# Patient Record
Sex: MALE | Race: WHITE | NOT HISPANIC OR LATINO | Employment: UNEMPLOYED | URBAN - METROPOLITAN AREA
[De-identification: names, ages, dates, MRNs, and addresses within clinical notes are randomized per-mention and may not be internally consistent; named-entity substitution may affect disease eponyms.]

---

## 2017-06-10 ENCOUNTER — OFFICE VISIT (OUTPATIENT)
Dept: URGENT CARE | Facility: CLINIC | Age: 22
End: 2017-06-10
Payer: COMMERCIAL

## 2017-06-10 VITALS
SYSTOLIC BLOOD PRESSURE: 124 MMHG | HEIGHT: 68 IN | OXYGEN SATURATION: 100 % | TEMPERATURE: 99.7 F | WEIGHT: 142 LBS | BODY MASS INDEX: 21.52 KG/M2 | HEART RATE: 97 BPM | DIASTOLIC BLOOD PRESSURE: 86 MMHG

## 2017-06-10 DIAGNOSIS — R50.9 FEVER, UNSPECIFIED FEVER CAUSE: ICD-10-CM

## 2017-06-10 PROCEDURE — 99203 OFFICE O/P NEW LOW 30 MIN: CPT | Performed by: PHYSICIAN ASSISTANT

## 2017-06-10 RX ORDER — OSELTAMIVIR PHOSPHATE 75 MG/1
75 CAPSULE ORAL 2 TIMES DAILY
Qty: 10 CAP | Refills: 0 | Status: SHIPPED | OUTPATIENT
Start: 2017-06-10 | End: 2017-06-15

## 2017-06-10 RX ORDER — CEPHALEXIN 500 MG/1
1000 CAPSULE ORAL 2 TIMES DAILY
Qty: 28 CAP | Refills: 0 | Status: SHIPPED | OUTPATIENT
Start: 2017-06-10 | End: 2017-06-17

## 2017-06-10 RX ORDER — IBUPROFEN 200 MG
200 TABLET ORAL EVERY 6 HOURS PRN
COMMUNITY
End: 2018-07-12

## 2017-06-10 RX ORDER — NAPROXEN 500 MG/1
500 TABLET ORAL 2 TIMES DAILY WITH MEALS
COMMUNITY
End: 2018-07-12

## 2017-06-10 ASSESSMENT — ENCOUNTER SYMPTOMS
RESPIRATORY NEGATIVE: 1
COUGH: 0
TROUBLE SWALLOWING: 1
SORE THROAT: 1
SWOLLEN GLANDS: 1
EYES NEGATIVE: 1
CONSTITUTIONAL NEGATIVE: 1

## 2017-06-10 NOTE — MR AVS SNAPSHOT
"        Nato Calvillo   6/10/2017 12:00 PM   Office Visit   MRN: [id number]    Department:  [facility]   Dept Phone:  [phone]    Description:  Male : 1995   Provider:  Mj Chilel PA-C           Reason for Visit     Pharyngitis X 3 days, Ear pain X 1 week, fever, dizzy spells, passed out last night       Allergies as of 6/10/2017     Allergen Noted Reactions    Amoxicillin 06/10/2017   Hives    HIves and fever as child       You were diagnosed with     Fever, unspecified fever cause   [[id number]]         Vital Signs     Blood Pressure Pulse Temperature Height Weight Body Mass Index    124/86 mmHg 97 37.6 °C (99.7 °F) 1.727 m (5' 7.99\") 64.411 kg (142 lb) 21.60 kg/m2    Oxygen Saturation                   100%           Basic Information     Date Of Birth Sex Race Ethnicity Preferred Language    1995 Male White Non- English      Health Maintenance     Patient has no pending health maintenance at this time      Current Immunizations     No immunizations on file.      Below and/or attached are the medications your provider expects you to take. Review all of your home medications and newly ordered medications with your provider and/or pharmacist. Follow medication instructions as directed by your provider and/or pharmacist. Please keep your medication list with you and share with your provider. Update the information when medications are discontinued, doses are changed, or new medications (including over-the-counter products) are added; and carry medication information at all times in the event of emergency situations     Allergies:  AMOXICILLIN - Hives               Medications  Valid as of: Leonie 10, 2017 - 12:39 PM    Generic Name Brand Name Tablet Size Instructions for use    Cephalexin (Cap) KEFLEX 500 MG Take 2 Caps by mouth 2 times a day for 7 days.        Ibuprofen (Tab) MOTRIN 200 MG Take 200 mg by mouth every 6 hours as needed.        Naproxen (Tab) NAPROSYN 500 MG Take 500 mg " by mouth 2 times a day, with meals.        Oseltamivir Phosphate (Cap) TAMIFLU 75 MG Take 1 Cap by mouth 2 times a day for 5 days.        .                 Medicines prescribed today were sent to:     None      Medication refill instructions:       If your prescription bottle indicates you have medication refills left, it is not necessary to call your provider’s office. Please contact your pharmacy and they will refill your medication.    If your prescription bottle indicates you do not have any refills left, you may request refills at any time through one of the following ways: The online Playviews system (except Urgent Care), by calling your provider’s office, or by asking your pharmacy to contact your provider’s office with a refill request. Medication refills are processed only during regular business hours and may not be available until the next business day. Your provider may request additional information or to have a follow-up visit with you prior to refilling your medication.   *Please Note: Medication refills are assigned a new Rx number when refilled electronically. Your pharmacy may indicate that no refills were authorized even though a new prescription for the same medication is available at the pharmacy. Please request the medicine by name with the pharmacy before contacting your provider for a refill.           Playviews Access Code: [email or web address]  Expires: 7/10/2017 12:39 PM    Playviews  A secure, online tool to manage your health information     [employer]’s Playviews® is a secure, online tool that connects you to your personalized health information from the privacy of your home -- day or night - making it very easy for you to manage your healthcare. Once the activation process is completed, you can even access your medical information using the Playviews shellie, which is available for free in the Apple Shellie store or Google Play store.     Playviews provides the following levels of access (as shown below):    My Chart Features   [facility] Doctor [facility]  Specialists [facility]  Urgent  Care Non-[facility]  Primary Care  Doctor   Email your healthcare team securely and privately 24/7 X X X    Manage appointments: schedule your next appointment; view details of past/upcoming appointments X      Request prescription refills. X      View recent personal medical records, including lab and immunizations X X X X   View health record, including health history, allergies, medications X X X X   Read reports about your outpatient visits, procedures, consult and ER notes X X X X   See your discharge summary, which is a recap of your hospital and/or ER visit that includes your diagnosis, lab results, and care plan. X X       How to register for [facility]:  1. Go to  [email or web address].  2. Click on the Sign Up Now box, which takes you to the New Member Sign Up page. You will need to provide the following information:  a. Enter your [facility] Access Code exactly as it appears at the top of this page. (You will not need to use this code after you’ve completed the sign-up process. If you do not sign up before the expiration date, you must request a new code.)   b. Enter your date of birth.   c. Enter your home email address.   d. Click Submit, and follow the next screen’s instructions.  3. Create a [facility] ID. This will be your [facility] login ID and cannot be changed, so think of one that is secure and easy to remember.  4. Create a [facility] password. You can change your password at any time.  5. Enter your Password Reset Question and Answer. This can be used at a later time if you forget your password.   6. Enter your e-mail address. This allows you to receive e-mail notifications when new information is available in [facility].  7. Click Sign Up. You can now view your health information.    For assistance activating your [facility] account, call [phone]

## 2017-06-10 NOTE — PROGRESS NOTES
"Subjective:      Nato Calvillo is a 21 y.o. male who presents with Pharyngitis            Pharyngitis   This is a new problem. The current episode started in the past 7 days (st, ear pn this wk; improved now but still running fevers 102; no cough/GI sx). The problem has been unchanged. Neither side of throat is experiencing more pain than the other. There has been no fever. The pain is moderate. Associated symptoms include ear pain, swollen glands and trouble swallowing. Pertinent negatives include no coughing or ear discharge. He has had no exposure to strep or mono. He has tried nothing for the symptoms. The treatment provided no relief.       Review of Systems   Constitutional: Negative.    HENT: Positive for ear pain, sore throat and trouble swallowing. Negative for ear discharge.    Eyes: Negative.    Respiratory: Negative.  Negative for cough.    Skin: Negative.           Objective:     /86 mmHg  Pulse 97  Temp(Src) 37.6 °C (99.7 °F)  Ht 1.727 m (5' 7.99\")  Wt 64.411 kg (142 lb)  BMI 21.60 kg/m2  SpO2 100%     Physical Exam   Constitutional: He is oriented to person, place, and time. He appears well-developed and well-nourished. No distress.   HENT:   Head: Normocephalic and atraumatic.   Mouth/Throat: No oropharyngeal exudate.   +phar./tons redn     Eyes: EOM are normal. Pupils are equal, round, and reactive to light.   Neck: Normal range of motion. Neck supple.   Cardiovascular: Normal rate.    Pulmonary/Chest: Effort normal. No respiratory distress.   Lymphadenopathy:     He has cervical adenopathy.   Neurological: He is alert and oriented to person, place, and time.   Skin: Skin is warm and dry.   Psychiatric: He has a normal mood and affect. His behavior is normal. Judgment and thought content normal.   Nursing note and vitals reviewed.    Filed Vitals:    06/10/17 1200   BP: 124/86   Pulse: 97   Temp: 37.6 °C (99.7 °F)   Height: 1.727 m (5' 7.99\")   Weight: 64.411 kg (142 lb)   SpO2: 100% "     Active Ambulatory Problems     Diagnosis Date Noted   • No Active Ambulatory Problems     Resolved Ambulatory Problems     Diagnosis Date Noted   • No Resolved Ambulatory Problems     No Additional Past Medical History     No current outpatient prescriptions on file prior to visit.     No current facility-administered medications on file prior to visit.     Gargles, Cepacol lozenges, Aleve/Advil as needed for throat pain  History reviewed. No pertinent family history.  Amoxicillin            Assessment/Plan:     ·  fever, sweats; viral?[flu?]; vs other      · Trial tamiflu; [abx if persist only]  · F/u PCP if sx persist; would need labs etc.

## 2018-07-12 ENCOUNTER — OFFICE VISIT (OUTPATIENT)
Dept: INTERNAL MEDICINE | Facility: MEDICAL CENTER | Age: 23
End: 2018-07-12
Payer: COMMERCIAL

## 2018-07-12 ENCOUNTER — HOSPITAL ENCOUNTER (OUTPATIENT)
Dept: LAB | Facility: MEDICAL CENTER | Age: 23
End: 2018-07-12
Attending: INTERNAL MEDICINE
Payer: COMMERCIAL

## 2018-07-12 VITALS
HEIGHT: 67 IN | WEIGHT: 164 LBS | TEMPERATURE: 98.7 F | OXYGEN SATURATION: 93 % | DIASTOLIC BLOOD PRESSURE: 98 MMHG | SYSTOLIC BLOOD PRESSURE: 128 MMHG | HEART RATE: 98 BPM | RESPIRATION RATE: 18 BRPM | BODY MASS INDEX: 25.74 KG/M2

## 2018-07-12 DIAGNOSIS — R21 SKIN RASH: ICD-10-CM

## 2018-07-12 DIAGNOSIS — F41.9 ANXIETY AND DEPRESSION: ICD-10-CM

## 2018-07-12 DIAGNOSIS — M79.602 LEFT ARM PAIN: ICD-10-CM

## 2018-07-12 DIAGNOSIS — F32.A ANXIETY AND DEPRESSION: ICD-10-CM

## 2018-07-12 DIAGNOSIS — F12.90 MARIJUANA USE: ICD-10-CM

## 2018-07-12 LAB
ALBUMIN SERPL BCP-MCNC: 5 G/DL (ref 3.2–4.9)
ALBUMIN/GLOB SERPL: 1.9 G/DL
ALP SERPL-CCNC: 67 U/L (ref 30–99)
ALT SERPL-CCNC: 43 U/L (ref 2–50)
ANION GAP SERPL CALC-SCNC: 10 MMOL/L (ref 0–11.9)
AST SERPL-CCNC: 23 U/L (ref 12–45)
BASOPHILS # BLD AUTO: 0.5 % (ref 0–1.8)
BASOPHILS # BLD: 0.04 K/UL (ref 0–0.12)
BILIRUB SERPL-MCNC: 1.3 MG/DL (ref 0.1–1.5)
BUN SERPL-MCNC: 16 MG/DL (ref 8–22)
CALCIUM SERPL-MCNC: 10.2 MG/DL (ref 8.5–10.5)
CHLORIDE SERPL-SCNC: 101 MMOL/L (ref 96–112)
CO2 SERPL-SCNC: 26 MMOL/L (ref 20–33)
CREAT SERPL-MCNC: 1.05 MG/DL (ref 0.5–1.4)
EOSINOPHIL # BLD AUTO: 0.04 K/UL (ref 0–0.51)
EOSINOPHIL NFR BLD: 0.5 % (ref 0–6.9)
ERYTHROCYTE [DISTWIDTH] IN BLOOD BY AUTOMATED COUNT: 38.5 FL (ref 35.9–50)
GLOBULIN SER CALC-MCNC: 2.7 G/DL (ref 1.9–3.5)
GLUCOSE SERPL-MCNC: 97 MG/DL (ref 65–99)
HCT VFR BLD AUTO: 49 % (ref 42–52)
HGB BLD-MCNC: 16.5 G/DL (ref 14–18)
IMM GRANULOCYTES # BLD AUTO: 0.02 K/UL (ref 0–0.11)
IMM GRANULOCYTES NFR BLD AUTO: 0.3 % (ref 0–0.9)
LYMPHOCYTES # BLD AUTO: 1.91 K/UL (ref 1–4.8)
LYMPHOCYTES NFR BLD: 25.1 % (ref 22–41)
MCH RBC QN AUTO: 30.2 PG (ref 27–33)
MCHC RBC AUTO-ENTMCNC: 33.7 G/DL (ref 33.7–35.3)
MCV RBC AUTO: 89.7 FL (ref 81.4–97.8)
MONOCYTES # BLD AUTO: 0.7 K/UL (ref 0–0.85)
MONOCYTES NFR BLD AUTO: 9.2 % (ref 0–13.4)
NEUTROPHILS # BLD AUTO: 4.9 K/UL (ref 1.82–7.42)
NEUTROPHILS NFR BLD: 64.4 % (ref 44–72)
NRBC # BLD AUTO: 0 K/UL
NRBC BLD-RTO: 0 /100 WBC
PLATELET # BLD AUTO: 321 K/UL (ref 164–446)
PMV BLD AUTO: 9.8 FL (ref 9–12.9)
POTASSIUM SERPL-SCNC: 3.8 MMOL/L (ref 3.6–5.5)
PROT SERPL-MCNC: 7.7 G/DL (ref 6–8.2)
RBC # BLD AUTO: 5.46 M/UL (ref 4.7–6.1)
SODIUM SERPL-SCNC: 137 MMOL/L (ref 135–145)
TSH SERPL DL<=0.005 MIU/L-ACNC: 0.98 UIU/ML (ref 0.38–5.33)
WBC # BLD AUTO: 7.6 K/UL (ref 4.8–10.8)

## 2018-07-12 PROCEDURE — 84443 ASSAY THYROID STIM HORMONE: CPT

## 2018-07-12 PROCEDURE — 85025 COMPLETE CBC W/AUTO DIFF WBC: CPT

## 2018-07-12 PROCEDURE — 99204 OFFICE O/P NEW MOD 45 MIN: CPT | Mod: GC | Performed by: INTERNAL MEDICINE

## 2018-07-12 PROCEDURE — 36415 COLL VENOUS BLD VENIPUNCTURE: CPT

## 2018-07-12 PROCEDURE — 80053 COMPREHEN METABOLIC PANEL: CPT

## 2018-07-12 RX ORDER — ASPIRIN 325 MG
325 TABLET ORAL EVERY 6 HOURS PRN
COMMUNITY
End: 2018-08-15

## 2018-07-12 RX ORDER — ESCITALOPRAM OXALATE 10 MG/1
10 TABLET ORAL DAILY
Qty: 30 TAB | Refills: 0 | Status: SHIPPED | OUTPATIENT
Start: 2018-07-20 | End: 2018-08-15 | Stop reason: SDUPTHER

## 2018-07-12 RX ORDER — CYCLOBENZAPRINE HCL 5 MG
5 TABLET ORAL 3 TIMES DAILY PRN
Qty: 30 TAB | Refills: 0 | Status: SHIPPED | OUTPATIENT
Start: 2018-07-12 | End: 2018-08-15

## 2018-07-12 NOTE — PROGRESS NOTES
New Patient to Establish    Reason to establish: evaluation of ongoing symptoms    CC: shoulder pain    HPI: 30-year-old male patient with past medical history of anxiety and depression comes in for evaluation of ongoing symptoms and to establish care.    # Left arm pain: Patient states he has been having pain over the anterior and  lateral aspect of left arm, started on Monday when he was lying in the couch and sudden in onset.  Patient states he began to feel left upper arm throbbing pain intense in quality, within 30 seconds began to have lightheaded associated with dizziness. Later after 1-2 minutes symptoms eased up little bit and then within few minutes began to feel tingling and numbness. He later felt back to normal within 5 minutes. Has been taking aspirin 325 1 tablet twice daily since Monday with some symptoms relief. Patient was taking ibuprofen or naproxen as needed for the headaches. Patient states his left arm pain,  he felt like as though he was having heart attack, denies chest pain, palpitations, shortness of breath.Patient began to take marijuana for the past 1-2 months 5 mg once or twice daily, last dose two 2 days back.    # Muscle spasms: Patient complaints of muscle spasms which occur intermittently.Patient has a history of anxiety and depression and gets  tension headaches intermittently associated with his back spasms. Also had a work related injury 2-3 years back since then he gets back spasms every couple of months.    # Anxiety and depression: Diagnosed at the age of 16 but he was not on any medications. Underwent therapy, followed up with psychiatrist. He had suicidal ideation back then, because of his sexual orientation. Anxiety component more compared to depression. Anxious about all general- day to day issues.    # Skin Rash : Intermittent for the past few years. Followed up with dermatologist about 2-3 years back who diagnosed as a seborrheic dermatitis/ dandruff and was given  cortisone cream and he applies whenever he has this flareup. Currently stable and denies any skin rash.      Patient Active Problem List    Diagnosis Date Noted   • Left arm pain 07/12/2018   • Anxiety 07/12/2018   • Skin rash 07/12/2018   • Marijuana use 07/12/2018       Past Medical History:   Diagnosis Date   • Allergy    • Anxiety    • Depression    • Head ache        Current Outpatient Prescriptions   Medication Sig Dispense Refill   • aspirin (ASA) 325 MG Tab Take 325 mg by mouth every 6 hours as needed.       No current facility-administered medications for this visit.        Allergies as of 07/12/2018 - Reviewed 07/12/2018   Allergen Reaction Noted   • Amoxicillin Hives 06/10/2017       Social History     Social History   • Marital status: Single     Spouse name: N/A   • Number of children: N/A   • Years of education: N/A     Occupational History   • Not on file.     Social History Main Topics   • Smoking status: Never Smoker   • Smokeless tobacco: Never Used   • Alcohol use No   • Drug use: Yes     Types: Marijuana      Comment: 1-2 months last to [facility] with anxiety - 5mg once a day   • Sexual activity: Yes     Partners: Male     Other Topics Concern   • Not on file     Social History Narrative   • No narrative on file       Family History   Problem Relation Age of Onset   • Hypertension Mother    • Depression Mother    • Arthritis Mother    • Diabetes Father    • Heart Disease Father    • Alzheimer's Disease Father    • Depression Brother    • Dementia Maternal Grandmother    • Dementia Paternal Grandmother    • Cancer Paternal Grandmother    • Diabetes Paternal Grandfather        Past Surgical History:   Procedure Laterality Date   • TONSILLECTOMY AND ADENOIDECTOMY         ROS: As per HPI. Additional pertinent symptoms as noted below.    Constitutional: Denies fever/chills/weight changes.   Eyes: Denies changes/pain in vision  ENT: Denies sore throat/congestion/ear ache.   Cardiovascular: Denies chest  "pain /palpitations/edema.   Respiratory: Denies SOB/cough/PND/orthopnea  Abdomen: Denies difficulty swallowing/ diarrhea/constipation/abdominal pain/nausea/vomiting  Genitourinary: Normal urinary habits.   Musculo-skeletal: normal ambulation.+left upper arm pain and intermittent muscle spasms.  Skin: Denies rash/lesions.+ Intermittent skin rash due to dandruff.  Neurological: Denies weakness/tingling/numbness/headache  Psychological: good mood and cooperative. +anxiety and depression       /100   Pulse 98   Temp 37.1 °C (98.7 °F)   Resp 18   Ht 1.702 m (5' 7\")   Wt 74.4 kg (164 lb)   SpO2 93%   BMI 25.69 kg/m²     Physical Exam  General:  Alert and oriented, No apparent distress.    Eyes: Pupils equal and reactive. No scleral icterus.    Throat: Clear no erythema or exudates noted.    Neck: Supple. No lymphadenopathy noted. Thyroid not enlarged.    Lungs: Clear to auscultation and percussion bilaterally.    Cardiovascular: Regular rate and rhythm. No murmurs, rubs or gallops.    Abdomen:  Benign. No rebound or guarding noted.    Extremities: No clubbing, cyanosis, edema.    Skin: Clear. No rash or suspicious skin lesions noted.    Neurological: Oriented to time, place, and person .Cranial nerves intact. No motor/sensory deficits.Reflexes were normal and symmetrical in both upper and lower extremities     Musculoskeletal : NROM of all extremities. Tenderness over left arm.    Note: I have reviewed all pertinent labs and diagnostic tests associated with this visit with specific comments listed under the assessment and plan below      Assessment and Plan    1. Left arm pain  likely due to muscle sprain so will start him on flexeril 5 mg 1 tablet PO TID as needed for total of 10 days.    - advised to apply heat or cold decompression  - advised to take Ibuprofen or advil as needed for symptomatic relief  - will consider PT if symptoms does not subside in future.    2. Anxiety and depression  - chronic " history  - currently PHQ9  Score is 8 and denies SI/HI.  - refuses referral to psychiatry or psychology or behavioral therapy because of insurance issues and unable to afford co-pays.  - started on lexapro 10 mg 1 tab PO once daily.  - Patient wants to stop flexeril within 1 week and then he wanted to start taking lexapro.  -education and counseling for anxiety and depression.    3. Skin rash  - likely due to seborrheic dermatitis  - advised to use selenium shampoos and advised to continue steroid cream as needed  - will continue to follow up and advised to notify if above therapy does not work or symptoms worsen then will consider ordering antifungal agents.    4. Marijuana use  -education and counseling for marijuana use given .       Risk Assessment (discuss potential complications a function of chronic problems): spent 35 min's discussing plans of management and educating patient regarding his current condition and related complications    Complexity (discuss number of co-morbidities): discussed muscle sprain, anxiety, depression, marijuana use, fungal skin infection.    Signed by: Dallin Campbell M.D.

## 2018-07-12 NOTE — PATIENT INSTRUCTIONS
Generalized Anxiety Disorder  Generalized anxiety disorder (JAYLEN) is a mental disorder. It interferes with life functions, including relationships, work, and school.  JAYLEN is different from normal anxiety, which everyone experiences at some point in their lives in response to specific life events and activities. Normal anxiety actually helps us prepare for and get through these life events and activities. Normal anxiety goes away after the event or activity is over.   JAYLEN causes anxiety that is not necessarily related to specific events or activities. It also causes excess anxiety in proportion to specific events or activities. The anxiety associated with JAYLEN is also difficult to control. JAYLEN can vary from mild to severe. People with severe JAYLEN can have intense waves of anxiety with physical symptoms (panic attacks).   SYMPTOMS  The anxiety and worry associated with JAYLEN are difficult to control. This anxiety and worry are related to many life events and activities and also occur more days than not for 6 months or longer. People with JAYLEN also have three or more of the following symptoms (one or more in children):  · Restlessness.    · Fatigue.  · Difficulty concentrating.    · Irritability.  · Muscle tension.  · Difficulty sleeping or unsatisfying sleep.  DIAGNOSIS  JAYLEN is diagnosed through an assessment by your health care provider. Your health care provider will ask you questions about your mood, physical symptoms, and events in your life. Your health care provider may ask you about your medical history and use of alcohol or drugs, including prescription medicines. Your health care provider may also do a physical exam and blood tests. Certain medical conditions and the use of certain substances can cause symptoms similar to those associated with JAYLEN. Your health care provider may refer you to a mental health specialist for further evaluation.  TREATMENT  The following therapies are usually used to treat JAYLEN:    · Medication. Antidepressant medication usually is prescribed for long-term daily control. Antianxiety medicines may be added in severe cases, especially when panic attacks occur.    · Talk therapy (psychotherapy). Certain types of talk therapy can be helpful in treating JAYLEN by providing support, education, and guidance. A form of talk therapy called cognitive behavioral therapy can teach you healthy ways to think about and react to daily life events and activities.  · Stress management techniques. These include yoga, meditation, and exercise and can be very helpful when they are practiced regularly.  A mental health specialist can help determine which treatment is best for you. Some people see improvement with one therapy. However, other people require a combination of therapies.  This information is not intended to replace advice given to you by your health care provider. Make sure you discuss any questions you have with your health care provider.  Document Released: 04/14/2014 Document Revised: 01/08/2016 Document Reviewed: 04/14/2014  [employer] Interactive Patient Education © 2017 [employer]

## 2018-08-15 ENCOUNTER — OFFICE VISIT (OUTPATIENT)
Dept: INTERNAL MEDICINE | Facility: MEDICAL CENTER | Age: 23
End: 2018-08-15
Payer: COMMERCIAL

## 2018-08-15 VITALS
BODY MASS INDEX: 25.58 KG/M2 | TEMPERATURE: 99.4 F | HEART RATE: 87 BPM | OXYGEN SATURATION: 95 % | DIASTOLIC BLOOD PRESSURE: 84 MMHG | WEIGHT: 163 LBS | SYSTOLIC BLOOD PRESSURE: 128 MMHG | HEIGHT: 67 IN

## 2018-08-15 DIAGNOSIS — F32.A ANXIETY AND DEPRESSION: ICD-10-CM

## 2018-08-15 DIAGNOSIS — F41.9 ANXIETY AND DEPRESSION: ICD-10-CM

## 2018-08-15 PROCEDURE — 99213 OFFICE O/P EST LOW 20 MIN: CPT | Mod: GE | Performed by: INTERNAL MEDICINE

## 2018-08-15 RX ORDER — BIOTIN 10 MG
TABLET ORAL
COMMUNITY

## 2018-08-15 RX ORDER — ESCITALOPRAM OXALATE 20 MG/1
20 TABLET ORAL DAILY
Qty: 60 TAB | Refills: 0 | Status: SHIPPED | OUTPATIENT
Start: 2018-08-15 | End: 2018-10-18 | Stop reason: SDUPTHER

## 2018-08-15 RX ORDER — IBUPROFEN 200 MG
200 TABLET ORAL EVERY 6 HOURS PRN
COMMUNITY
End: 2019-05-22

## 2018-08-15 ASSESSMENT — PATIENT HEALTH QUESTIONNAIRE - PHQ9
SUM OF ALL RESPONSES TO PHQ QUESTIONS 1-9: 8
5. POOR APPETITE OR OVEREATING: 0 - NOT AT ALL
CLINICAL INTERPRETATION OF PHQ2 SCORE: 2

## 2018-08-15 ASSESSMENT — PAIN SCALES - GENERAL: PAINLEVEL: 2=MINIMAL-SLIGHT

## 2018-08-15 NOTE — ASSESSMENT & PLAN NOTE
- diagnosed with depression and anxiety when he was 16 years old.  - he has history of suicidal ideation in the past.  - 4 weeks ago was started on Lexapro 10 mg daily.  - his condition is improving.   - he is compliant to his medication.  - PHQ9 score stable at 8 but patient report feeling better in terms of depression with minor improvement in his anxiety.  - denies dianne or hypomania and suicidal or homicidal ideation.  - refused referral to psychiatry or psychology or behavioral therapy because of insurance issues and unable to afford co-pays.  - Increase Lexapro to 20 mg daily.  - Follow-up in 6 weeks.  - Instructed the patient to call the clinic or go to the emergency department if he will have suicidal or homicidal ideation.

## 2018-08-15 NOTE — PATIENT INSTRUCTIONS
- please  your medication from the pharmacy   - please follow up in 6 weeks   Depression, Adult  Depression refers to feeling sad, low, down in the dumps, blue, gloomy, or empty. In general, there are two kinds of depression:  1. Normal sadness or normal grief. This kind of depression is one that we all feel from time to time after upsetting life experiences, such as the loss of a job or the ending of a relationship. This kind of depression is considered normal, is short lived, and resolves within a few days to 2 weeks. Depression experienced after the loss of a loved one (bereavement) often lasts longer than 2 weeks but normally gets better with time.  2. Clinical depression. This kind of depression lasts longer than normal sadness or normal grief or interferes with your ability to function at home, at work, and in school. It also interferes with your personal relationships. It affects almost every aspect of your life. Clinical depression is an illness.  Symptoms of depression can also be caused by conditions other than those mentioned above, such as:  · Physical illness. Some physical illnesses, including underactive thyroid gland (hypothyroidism), severe anemia, specific types of cancer, diabetes, uncontrolled seizures, heart and lung problems, strokes, and chronic pain are commonly associated with symptoms of depression.  · Side effects of some prescription medicine. In some people, certain types of medicine can cause symptoms of depression.  · Substance abuse. Abuse of alcohol and illicit drugs can cause symptoms of depression.  SYMPTOMS  Symptoms of normal sadness and normal grief include the following:  · Feeling sad or crying for short periods of time.  · Not caring about anything (apathy).  · Difficulty sleeping or sleeping too much.  · No longer able to enjoy the things you used to enjoy.  · Desire to be by oneself all the time (social isolation).  · Lack of energy or motivation.  · Difficulty  concentrating or remembering.  · Change in appetite or weight.  · Restlessness or agitation.  Symptoms of clinical depression include the same symptoms of normal sadness or normal grief and also the following symptoms:  · Feeling sad or crying all the time.  · Feelings of guilt or worthlessness.  · Feelings of hopelessness or helplessness.  · Thoughts of suicide or the desire to harm yourself (suicidal ideation).  · Loss of touch with reality (psychotic symptoms). Seeing or hearing things that are not real (hallucinations) or having false beliefs about your life or the people around you (delusions and paranoia).  DIAGNOSIS   The diagnosis of clinical depression is usually based on how bad the symptoms are and how long they have lasted. Your health care provider will also ask you questions about your medical history and substance use to find out if physical illness, use of prescription medicine, or substance abuse is causing your depression. Your health care provider may also order blood tests.  TREATMENT   Often, normal sadness and normal grief do not require treatment. However, sometimes antidepressant medicine is given for bereavement to ease the depressive symptoms until they resolve.  The treatment for clinical depression depends on how bad the symptoms are but often includes antidepressant medicine, counseling with a mental health professional, or both. Your health care provider will help to determine what treatment is best for you.  Depression caused by physical illness usually goes away with appropriate medical treatment of the illness. If prescription medicine is causing depression, talk with your health care provider about stopping the medicine, decreasing the dose, or changing to another medicine.  Depression caused by the abuse of alcohol or illicit drugs goes away when you stop using these substances. Some adults need professional help in order to stop drinking or using drugs.  SEEK IMMEDIATE MEDICAL  CARE IF:  · You have thoughts about hurting yourself or others.  · You lose touch with reality (have psychotic symptoms).  · You are taking medicine for depression and have a serious side effect.  FOR MORE INFORMATION  · National [place] on Mental Illness: www.tg.org   · National [place] of Mental Health: www.nimh.nih.gov      This information is not intended to replace advice given to you by your health care provider. Make sure you discuss any questions you have with your health care provider.     Document Released: 12/15/2001 Document Revised: 01/08/2016 Document Reviewed: 03/18/2013  Elsevier Interactive Patient Education ©2016 [employer]

## 2018-08-15 NOTE — PROGRESS NOTES
Established Patient    Nato presents today with the following:    CC: Follow-up depression with anxiety.    HPI: Nato Calvillo is a 22 y.o. male with medical history of anxiety and depression presented to the clinic for follow-up.  He was diagnosed with depression and anxiety when he was 16 years old, was following up with psychiatrist, he has history of suicidal ideation in the past, 4 weeks ago was started on Lexapro 10 mg daily, his condition is improving, he is compliant to his medication, his PHQ9 score was 8, patient report feeling better in terms of depression with minor improvement in his anxiety, denies dianne or hypomania and suicidal or homicidal ideation.  Depression Screening    Little interest or pleasure in doing things?  1 - several days  Feeling down, depressed , or hopeless? 1 - several days  Trouble falling or staying asleep, or sleeping too much?  2 - more than half the days  Feeling tired or having little energy?  1 - several days  Poor appetite or overeating?  0 - not at all  Feeling bad about yourself - or that you are a failure or have let yourself or your family down? 1 - several days  Trouble concentrating on things, such as reading the newspaper or watching television? 0 - not at all  Moving or speaking so slowly that other people could have noticed.  Or the opposite - being so fidgety or restless that you have been moving around a lot more than usual?  2 - more than half the days  Thoughts that you would be better off dead, or of hurting yourself?  0 - not at all  Patient Health Questionnaire Score: 8      If depressive symptoms identified deferred to follow up visit unless specifically addressed in assesment and plan.    Interpretation of PHQ-9 Total Score   Score Severity   1-4 No Depression   5-9 Mild Depression   10-14 Moderate Depression   15-19 Moderately Severe Depression   20-27 Severe Depression          Patient Active Problem List    Diagnosis Date Noted   • Left arm pain  07/12/2018   • Anxiety and depression 07/12/2018   • Skin rash 07/12/2018   • Marijuana use 07/12/2018       Current Outpatient Prescriptions   Medication Sig Dispense Refill   • Multiple Vitamins-Minerals (MULTIVITAMIN ADULT) Chew Tab Take  by mouth.     • ibuprofen (MOTRIN) 200 MG Tab Take 200 mg by mouth every 6 hours as needed.     • escitalopram (LEXAPRO) 20 MG tablet Take 1 Tab by mouth every day. 60 Tab 0     No current facility-administered medications for this visit.        Social History     Social History   • Marital status: Single     Spouse name: N/A   • Number of children: N/A   • Years of education: N/A     Occupational History   • Not on file.     Social History Main Topics   • Smoking status: Never Smoker   • Smokeless tobacco: Never Used   • Alcohol use No   • Drug use: Yes     Types: Marijuana      Comment: per pt quit when started on Lexapro, 1-2 months last to [facility] with anxiety - 5mg once a day,    • Sexual activity: Yes     Partners: Male     Other Topics Concern   • Not on file     Social History Narrative   • No narrative on file       Family History   Problem Relation Age of Onset   • Hypertension Mother    • Depression Mother    • Arthritis Mother    • Diabetes Father    • Heart Disease Father    • Alzheimer's Disease Father    • Depression Brother    • Dementia Maternal Grandmother    • Dementia Paternal Grandmother    • Cancer Paternal Grandmother    • Diabetes Paternal Grandfather        ROS: As per HPI. Additional pertinent symptoms as noted below.  Review of Systems   Constitutional: Negative for fever, chills, weight loss, malaise/fatigue and diaphoresis.   HENT: Negative for ear discharge, ear pain, hearing loss and tinnitus.    Eyes: Negative for blurred vision, double vision, pain, discharge and redness.   Respiratory: Negative for cough, hemoptysis, sputum production, shortness of breath and wheezing.    Cardiovascular: Negative for chest pain, palpitations, orthopnea, leg  "swelling and PND.   Gastrointestinal: Negative for heartburn, nausea, vomiting, abdominal pain, diarrhea, constipation and blood in stool.   Genitourinary: Negative for dysuria, urgency, frequency, hematuria and flank pain.   Musculoskeletal: Negative for myalgias, joint pain and neck pain.   Skin: Negative for itching and rash.   Neurological: Negative for dizziness, tingling, tremors, sensory change, focal weakness, loss of consciousness, weakness and headaches.   Psychiatric/Behavioral: Negative for depression and suicidal ideas. The patient is not nervous/anxious and does not have insomnia.        /84   Pulse 87   Temp 37.4 °C (99.4 °F)   Ht 1.702 m (5' 7\")   Wt 73.9 kg (163 lb)   SpO2 95%   BMI 25.53 kg/m²     Physical Exam  Constitutional: Oriented to person, place, and time and well-developed, well-nourished, and in no distress. Vital signs are normal.   HENT:   Head: Normocephalic and atraumatic.   Mouth/Throat: Oropharynx is clear and moist.   Eyes: Conjunctivae are normal.   Neck: Neck supple. No JVD present. No tracheal deviation present.   Cardiovascular: Normal rate.  Exam reveals no gallop and no friction rub.    No murmur heard.  Pulmonary/Chest: Effort normal and breath sounds normal. No respiratory distress. he has no wheezes. he has no rales. he exhibits no tenderness.   Abdominal: Soft. Bowel sounds are normal. he exhibits no mass. There is no tenderness. There is no rebound and no guarding.   Musculoskeletal: Normal range of motion. he exhibits no edema.   Lymphadenopathy:     he has no cervical adenopathy.   Neurological: he is alert and oriented to person, place, and time. he has normal strength. Gait normal. GCS score is 15.  not anxious, not depressed, denies suicidal and homicidal ideation.  Skin: No rash noted. No erythema. No pallor.         Assessment and Plan    Anxiety and depression  - diagnosed with depression and anxiety when he was 16 years old.  - he has history of " suicidal ideation in the past.  - 4 weeks ago was started on Lexapro 10 mg daily.  - his condition is improving.   - he is compliant to his medication.  - PHQ9 score stable at 8 but patient report feeling better in terms of depression with minor improvement in his anxiety.  - denies dianne or hypomania and suicidal or homicidal ideation.  - refused referral to psychiatry or psychology or behavioral therapy because of insurance issues and unable to afford co-pays.  - Increase Lexapro to 20 mg daily.  - Follow-up in 6 weeks.  - Instructed the patient to call the clinic or go to the emergency department if he will have suicidal or homicidal ideation.        Signed by: Colten Shrestha M.D.

## 2018-08-15 NOTE — LETTER
[facility]  Dallin Campbell M.D.  [address]  [place] [address]  Fax: [phone]   Authorization for Release/Disclosure of   Protected Health Information   Name: KIKE CALVILLO : 1995 SSN: [id number]   Address: [address] [place] [address] [address]  [place] [address] Phone:    [phone] (home)    I authorize the entity listed below to release/disclose the PHI below to:   [facility]/Dallin Campbell M.D. and Colten Shrestha M.D.   Provider or Entity Name:     Address   City, State, Zip   Phone:      Fax:     Reason for request: continuity of care   Information to be released:    [  ] LAST COLONOSCOPY,  including any PATH REPORT and follow-up  [  ] LAST FIT/COLOGUARD RESULT [  ] LAST DEXA  [  ] LAST MAMMOGRAM  [  ] LAST PAP  [  ] LAST LABS [  ] RETINA EXAM REPORT  [  ] IMMUNIZATION RECORDS  [  ] Release all info      [  ] Check here and initial the line next to each item to release ALL health information INCLUDING  _____ Care and treatment for drug and / or alcohol abuse  _____ HIV testing, infection status, or AIDS  _____ Genetic Testing    DATES OF SERVICE OR TIME PERIOD TO BE DISCLOSED: _____________  I understand and acknowledge that:  * This Authorization may be revoked at any time by you in writing, except if your health information has already been used or disclosed.  * Your health information that will be used or disclosed as a result of you signing this authorization could be re-disclosed by the recipient. If this occurs, your re-disclosed health information may no longer be protected by State or Federal laws.  * You may refuse to sign this Authorization. Your refusal will not affect your ability to obtain treatment.  * This Authorization becomes effective upon signing and will  on (date) __________.      If no date is indicated, this Authorization will  one (1) year from the signature date.    Name: Kike Calvillo    Signature:   Date:     8/15/2018       PLEASE FAX REQUESTED  RECORDS BACK TO: [phone]

## 2018-10-18 ENCOUNTER — OFFICE VISIT (OUTPATIENT)
Dept: INTERNAL MEDICINE | Facility: MEDICAL CENTER | Age: 23
End: 2018-10-18
Payer: COMMERCIAL

## 2018-10-18 VITALS
HEIGHT: 67 IN | WEIGHT: 164 LBS | DIASTOLIC BLOOD PRESSURE: 88 MMHG | BODY MASS INDEX: 25.74 KG/M2 | OXYGEN SATURATION: 96 % | HEART RATE: 89 BPM | TEMPERATURE: 98.4 F | SYSTOLIC BLOOD PRESSURE: 144 MMHG

## 2018-10-18 DIAGNOSIS — F41.9 ANXIETY AND DEPRESSION: ICD-10-CM

## 2018-10-18 DIAGNOSIS — F32.A ANXIETY AND DEPRESSION: ICD-10-CM

## 2018-10-18 DIAGNOSIS — Z23 NEED FOR INFLUENZA VACCINATION: ICD-10-CM

## 2018-10-18 DIAGNOSIS — Z72.52 HIGH RISK HOMOSEXUAL BEHAVIOR: ICD-10-CM

## 2018-10-18 PROCEDURE — 90471 IMMUNIZATION ADMIN: CPT | Performed by: INTERNAL MEDICINE

## 2018-10-18 PROCEDURE — 90686 IIV4 VACC NO PRSV 0.5 ML IM: CPT | Performed by: INTERNAL MEDICINE

## 2018-10-18 PROCEDURE — 99214 OFFICE O/P EST MOD 30 MIN: CPT | Mod: 25 | Performed by: INTERNAL MEDICINE

## 2018-10-18 RX ORDER — ESCITALOPRAM OXALATE 20 MG/1
20 TABLET ORAL DAILY
Qty: 90 TAB | Refills: 1 | Status: SHIPPED | OUTPATIENT
Start: 2018-10-18 | End: 2019-01-11 | Stop reason: SDUPTHER

## 2018-10-18 RX ORDER — HYDROXYZINE HYDROCHLORIDE 25 MG/1
25 TABLET, FILM COATED ORAL EVERY 8 HOURS PRN
Qty: 60 TAB | Refills: 0 | Status: SHIPPED | OUTPATIENT
Start: 2018-10-18 | End: 2019-04-18

## 2018-10-18 ASSESSMENT — PATIENT HEALTH QUESTIONNAIRE - PHQ9
CLINICAL INTERPRETATION OF PHQ2 SCORE: 1
SUM OF ALL RESPONSES TO PHQ QUESTIONS 1-9: 5
5. POOR APPETITE OR OVEREATING: 0 - NOT AT ALL

## 2018-10-18 NOTE — NON-PROVIDER
"Nato Calvillo is a 23 y.o. male here for a non-provider visit for:   FLU    Reason for immunization: Annual Flu Vaccine  Immunization records indicate need for vaccine: Yes, confirmed with Epic  Minimum interval has been met for this vaccine: Yes  ABN completed: Not Indicated    Order and dose verified by: KELSEY  VIS Dated  08/07/15 was given to patient: Yes  All IAC Questionnaire questions were answered \"No.\"    Patient tolerated injection and no adverse effects were observed or reported: Yes    Pt scheduled for next dose in series: Not Indicated  "

## 2018-10-18 NOTE — PROGRESS NOTES
date  Chief Complaint   Patient presents with   • Depression     and Anxiety       HISTORY OF PRESENT ILLNESS: Patient is a 23 y.o. male established patient who presents today for the following.      1. Need for influenza vaccination  Patient interested in getting flu shot today    2. Anxiety and depression  Patient requesting refill of his Lexapro 20 mg to help with his anxiety as well as depression. States he has had problems with depression for many years and currently denies SI or HI.  Occasionally has anxiety attacks and panic situations.    3. High risk homosexual behavior  Has a male sexual partner and does not use condom. Monogamous relationship      Past Medical History:   Diagnosis Date   • Allergy    • Anxiety    • Depression    • Head ache        Patient Active Problem List    Diagnosis Date Noted   • High risk homosexual behavior 10/18/2018   • Left arm pain 07/12/2018   • Anxiety and depression 07/12/2018   • Skin rash 07/12/2018   • Marijuana use 07/12/2018       Allergies:Amoxicillin    Current Outpatient Prescriptions   Medication Sig Dispense Refill   • escitalopram (LEXAPRO) 20 MG tablet Take 1 Tab by mouth every day. 90 Tab 1   • hydrOXYzine HCl (ATARAX) 25 MG Tab Take 1 Tab by mouth every 8 hours as needed for Anxiety. 60 Tab 0   • Multiple Vitamins-Minerals (MULTIVITAMIN ADULT) Chew Tab Take  by mouth.     • ibuprofen (MOTRIN) 200 MG Tab Take 200 mg by mouth every 6 hours as needed.       No current facility-administered medications for this visit.        Social History   Substance Use Topics   • Smoking status: Never Smoker   • Smokeless tobacco: Never Used   • Alcohol use No       Family History   Problem Relation Age of Onset   • Hypertension Mother    • Depression Mother    • Arthritis Mother    • Diabetes Father    • Heart Disease Father    • Alzheimer's Disease Father    • Depression Brother    • Dementia Maternal Grandmother    • Dementia Paternal Grandmother    • Cancer Paternal  "Grandmother    • Diabetes Paternal Grandfather          Review of Systems   Patient denies Fevers/chills/nausea/vomiting/chest pain/sob/blood in stools/black stools/blood in urine.all other systems are reviewed See HPI    Exam:  Blood pressure 144/88, pulse 89, temperature 36.9 °C (98.4 °F), temperature source Temporal, height 1.702 m (5' 7\"), weight 74.4 kg (164 lb), SpO2 96 %. Body mass index is 25.69 kg/m².  Constitutional:  NAD, well appearing.  HEENT:   NC/AT  Cardiovascular: RRR.   No m/r/g. No carotid bruits.       Lungs:   CTAB, no w/r/r, no respiratory distress.  Abdomen: Soft, NT/ND + BS, no masses, no suprapubic tenderness, no hepatomegaly.  Extremities:  2+ DP and radial pulses bilaterally.  No c/c/e.  Skin:  Warm and dry.    Neurologic: Alert & oriented x 3, CN II-XII grossly intact, strength and sensation grossly intact.  No focal deficits noted.  Psychiatric:  Affect normal, mood normal, judgment normal.    Assessment/Plan:     1. Need for influenza vaccination  Flu shot given today in the office  - Influenza Vaccine Quad Injection >3Y (PF)    2. Anxiety and depression  Lexapro refill today and advise patient to take hydroxyzine as needed during episodes of anxiety attacks. Also advised to follow up with the therapist.  - escitalopram (LEXAPRO) 20 MG tablet; Take 1 Tab by mouth every day.  Dispense: 90 Tab; Refill: 1  - hydrOXYzine HCl (ATARAX) 25 MG Tab; Take 1 Tab by mouth every 8 hours as needed for Anxiety.  Dispense: 60 Tab; Refill: 0    3. High risk homosexual behavior  Given his high risk behavior advised to screen for HIV once and advised to use condom on regular basis.  - HIV AG/AB COMBO ASSAY SCREENING; Future      All imaging results and lab results and consult notes are reviewed at this visit.  Followup: Return in about 6 months (around 4/18/2019) for with Dr. Campbell.    Please note that this dictation was created using voice recognition software. I have made every reasonable attempt to " correct obvious errors, but I expect that there are errors of grammar and possibly content that I did not discover before finalizing the note.

## 2019-02-11 ENCOUNTER — OFFICE VISIT (OUTPATIENT)
Dept: URGENT CARE | Facility: CLINIC | Age: 24
End: 2019-02-11
Payer: COMMERCIAL

## 2019-02-11 VITALS
TEMPERATURE: 99.4 F | RESPIRATION RATE: 16 BRPM | SYSTOLIC BLOOD PRESSURE: 142 MMHG | DIASTOLIC BLOOD PRESSURE: 94 MMHG | OXYGEN SATURATION: 95 % | HEIGHT: 67 IN | WEIGHT: 164 LBS | BODY MASS INDEX: 25.74 KG/M2 | HEART RATE: 78 BPM

## 2019-02-11 DIAGNOSIS — S09.90XA INJURY OF HEAD, INITIAL ENCOUNTER: ICD-10-CM

## 2019-02-11 PROCEDURE — 99203 OFFICE O/P NEW LOW 30 MIN: CPT | Performed by: EMERGENCY MEDICINE

## 2019-02-11 ASSESSMENT — ENCOUNTER SYMPTOMS
NAUSEA: 1
FOCAL WEAKNESS: 0
DOUBLE VISION: 0
FEVER: 0
VOMITING: 0
BLURRED VISION: 0
LOSS OF CONSCIOUSNESS: 0
MEMORY LOSS: 0
DISORIENTATION: 0
SENSORY CHANGE: 0
DIZZINESS: 1
NUMBNESS: 0
HEADACHES: 1

## 2019-02-12 NOTE — PATIENT INSTRUCTIONS
Post-Concussion Syndrome  Introduction  Post-concussion syndrome is the symptoms that can occur after a head injury. These symptoms can last from weeks to months.  Follow these instructions at home:  · Take medicines only as told by your doctor.  Do not take aspirin.  · Sleep with your head raised to help with headaches.  · Avoid activities that can cause another head injury.  ¨ Do not play contact sports like football, hockey, soccer, or basketball.  ¨ Do not do other risky activities like downhill skiing, martial arts, or horseback riding until your doctor says it is okay.  · Keep all follow-up visits as told by your doctor. This is important.  Contact a doctor if:  · You have a harder time:  ¨ Paying attention.  ¨ Focusing.  ¨ Remembering.  ¨ Learning new information.  ¨ Dealing with stress.  · You need more time to complete tasks.  · You are easily bothered (irritable).  · You have more symptoms.  Get help if you have any of these symptoms for more than two weeks after your injury:  · Long-lasting (chronic) headaches.  · Dizziness.  · Trouble balancing.  · Feeling sick to your stomach (nauseous).  · Trouble with your vision.  · Noise or light bothers you more.  · Depression.  · Mood swings.  · Feeling worried (anxious).  · Easily bothered.  · Memory problems.  · Trouble concentrating or paying attention.  · Sleep problems.  · Feeling tired all of the time.  Get help right away if:  · You feel confused.  · You feel very sleepy.  · You are hard to wake up.  · You feel sick to your stomach.  · You keep throwing up (vomiting).  · You feel like you are moving when you are not (vertigo).  · Your eyes move back and forth very quickly.  · You start shaking (convulsing) or pass out (faint).  · You have very bad headaches that do not get better with medicine.  · You cannot use your arms or legs like normal.  · One of the black centers of your eyes (pupils) is bigger than the other.  · You have clear or bloody fluid coming  from your nose or ears.  · Your problems get worse, not better.  This information is not intended to replace advice given to you by your health care provider. Make sure you discuss any questions you have with your health care provider.  Document Released: 01/25/2006 Document Revised: 05/25/2017 Document Reviewed: 03/25/2015  © 2017 [employer]  Head Injury, Adult  There are many types of head injuries. They can be as minor as a bump. Some head injuries can be worse. Worse injuries include:  · A strong hit to the head that hurts the brain (concussion).  · A bruise of the brain (contusion). This means there is bleeding in the brain that can cause swelling.  · A cracked skull (skull fracture).  · Bleeding in the brain that gathers, gets thick (makes a clot), and forms a bump (hematoma).  Most problems from a head injury come in the first 24 hours. However, you may still have side effects up to 7-10 days after your injury. It is important to watch your condition for any changes.  Follow these instructions at home:  Activity  · Rest as much as possible.  · Avoid activities that are hard or tiring.  · Make sure you get enough sleep.  · Limit activities that need a lot of thought or attention, such as:  ¨ Watching TV.  ¨ Playing memory games and puzzles.  ¨ Job-related work or homework.  ¨ Working on the computer, social media, and texting.  · Avoid activities that could cause another head injury until your doctor says it is okay. This includes playing sports.  · Ask your doctor when it is safe for you to go back to your normal activities, such as work or school. Ask your doctor for a step-by-step plan for slowly going back to your normal activities.  · Ask your doctor when you can drive, ride a bicycle, or use heavy machinery. Never do these activities if you are dizzy.  Lifestyle  · Do not drink alcohol until your doctor says it is okay.  · Avoid drug use.  · If it is harder than usual to remember things, write them  down.  · If you are easily distracted, try to do one thing at a time.  · Talk with family members or close friends when making important decisions.  · Tell your friends, family, a trusted coworker, and  about your injury, symptoms, and limits (restrictions). Have them watch for any problems that are new or getting worse.  General instructions  · Take over-the-counter and prescription medicines only as told by your doctor.  · Have someone stay with you for 24 hours after your head injury. This person should watch you for any changes in your symptoms and be ready to get help.  · Keep all follow-up visits as told by your doctor. This is important.  Prevention  · Work on your balance and strength. This can help you avoid falls.  · Wear a seatbelt when you are in a moving vehicle.  · Wear a helmet when:  ¨ Riding a bicycle.  ¨ Skiing.  ¨ Doing any other sport or activity that has a risk of injury.  · Drink alcohol only in moderation.  · Make your home safer by:  ¨ Getting rid of clutter from the floors and stairs, like things that can make you trip.  ¨ Using grab bars in bathrooms and handrails by stairs.  ¨ Placing non-slip mats on floors and in bathtubs.  ¨ Putting more light in dim areas.  Get help right away if:  · You have:  ¨ A very bad (severe) headache that is not helped by medicine.  ¨ Trouble walking or weakness in your arms and legs.  ¨ Clear or bloody fluid coming from your nose or ears.  ¨ Changes in your seeing (vision).  ¨ Jerky movements that you cannot control (seizure).  · You throw up (vomit).  · Your symptoms get worse.  · You lose balance.  · Your speech is slurred.  · You pass out.  · You are sleepier and have trouble staying awake.  · The black centers of your eyes (pupils) change in size.  These symptoms may be an emergency. Do not wait to see if the symptoms will go away. Get medical help right away. Call your local emergency services (911 in the [place] Do not drive yourself to the  [facility].   This information is not intended to replace advice given to you by your health care provider. Make sure you discuss any questions you have with your health care provider.  Document Released: 11/30/2009 Document Revised: 07/13/2017 Document Reviewed: 06/27/2017  [employer] Interactive Patient Education © 2017 [employer]

## 2019-02-12 NOTE — PROGRESS NOTES
Subjective:      Nato Calvillo is a 23 y.o. male who presents with Head Injury (x yesterday he was bringing grocery his foot got on the door and it hit his head, headache, no blurry vision. )            Head Injury    Incident onset: over 24 hours ago. The injury mechanism was a direct blow. There was no loss of consciousness. There was no blood loss. The quality of the pain is described as aching. The pain is mild. The pain has been fluctuating since the injury. Associated symptoms include headaches. Pertinent negatives include no blurred vision, disorientation, memory loss, numbness, tinnitus or vomiting. He has tried NSAIDs for the symptoms. The treatment provided significant relief.   Patient states yesterday evening caught foot in the door, door closed against his left head area.  Later noted headache, dizziness, nausea.  Today had a less headache, occasional dizziness, improving.    Review of Systems   Constitutional: Negative for fever.   HENT: Negative for hearing loss, nosebleeds and tinnitus.    Eyes: Negative for blurred vision and double vision.   Gastrointestinal: Positive for nausea. Negative for vomiting.   Skin: Negative for rash.   Neurological: Positive for dizziness and headaches. Negative for sensory change, focal weakness, loss of consciousness and numbness.   Psychiatric/Behavioral: Negative for memory loss.     PMH:  has a past medical history of Allergy; Anxiety; Depression; and Head ache.  MEDS:   Current Outpatient Prescriptions:   •  escitalopram (LEXAPRO) 20 MG tablet, Take 1 Tab by mouth every day., Disp: 90 Tab, Rfl: 1  •  hydrOXYzine HCl (ATARAX) 25 MG Tab, Take 1 Tab by mouth every 8 hours as needed for Anxiety., Disp: 60 Tab, Rfl: 0  •  Multiple Vitamins-Minerals (MULTIVITAMIN ADULT) Chew Tab, Take  by mouth., Disp: , Rfl:   •  ibuprofen (MOTRIN) 200 MG Tab, Take 200 mg by mouth every 6 hours as needed., Disp: , Rfl:   ALLERGIES:   Allergies   Allergen Reactions   • Amoxicillin Hives  "    HIves and fever as child      SURGHX:   Past Surgical History:   Procedure Laterality Date   • TONSILLECTOMY AND ADENOIDECTOMY       SOCHX:  reports that he has never smoked. He has never used smokeless tobacco. He reports that he uses drugs, including Marijuana. He reports that he does not drink alcohol.  FH: family history includes Alzheimer's Disease in his father; Arthritis in his mother; Cancer in his paternal grandmother; Dementia in his maternal grandmother and paternal grandmother; Depression in his brother and mother; Diabetes in his father and paternal grandfather; Heart Disease in his father; Hypertension in his mother.     Objective:     /94   Pulse 78   Temp 37.4 °C (99.4 °F)   Resp 16   Ht 1.702 m (5' 7\")   Wt 74.4 kg (164 lb)   SpO2 95%   BMI 25.69 kg/m²      Physical Exam   Constitutional: He is oriented to person, place, and time. He appears well-developed and well-nourished. He is cooperative. He does not have a sickly appearance. He does not appear ill. No distress.   HENT:   Head: Normocephalic. Head is without abrasion and without contusion. Hair is normal.       Right Ear: Hearing, tympanic membrane and ear canal normal.   Left Ear: Hearing, tympanic membrane and ear canal normal.   Nose: No rhinorrhea. No epistaxis.   Mouth/Throat: Oropharynx is clear and moist.   Eyes: Pupils are equal, round, and reactive to light. Conjunctivae, EOM and lids are normal.   Neck: Normal range of motion and phonation normal. Neck supple. No spinous process tenderness and no muscular tenderness present. Normal range of motion present.   Cardiovascular: Normal rate, regular rhythm and normal heart sounds.    Pulmonary/Chest: Effort normal and breath sounds normal.   Neurological: He is alert and oriented to person, place, and time. He has normal strength. He displays no tremor. No cranial nerve deficit or sensory deficit. He displays a negative Romberg sign. Gait normal.   Skin: Skin is warm, dry " and intact.   Psychiatric: He has a normal mood and affect. His speech is normal and behavior is normal. Thought content normal. Cognition and memory are normal.               Assessment/Plan:     1. Injury of head, initial encounter  Rest, OTC analgesia prn  F/U PCP if not resolving in the next week; ED if any worsening.

## 2019-04-18 ENCOUNTER — OFFICE VISIT (OUTPATIENT)
Dept: INTERNAL MEDICINE | Facility: MEDICAL CENTER | Age: 24
End: 2019-04-18
Payer: COMMERCIAL

## 2019-04-18 ENCOUNTER — HOSPITAL ENCOUNTER (OUTPATIENT)
Dept: LAB | Facility: MEDICAL CENTER | Age: 24
End: 2019-04-18
Attending: INTERNAL MEDICINE
Payer: COMMERCIAL

## 2019-04-18 VITALS
HEIGHT: 67 IN | WEIGHT: 166 LBS | HEART RATE: 114 BPM | OXYGEN SATURATION: 95 % | TEMPERATURE: 99.4 F | SYSTOLIC BLOOD PRESSURE: 114 MMHG | BODY MASS INDEX: 26.06 KG/M2 | DIASTOLIC BLOOD PRESSURE: 80 MMHG

## 2019-04-18 DIAGNOSIS — F32.A ANXIETY AND DEPRESSION: ICD-10-CM

## 2019-04-18 DIAGNOSIS — F41.9 ANXIETY AND DEPRESSION: ICD-10-CM

## 2019-04-18 DIAGNOSIS — Z00.00 HEALTH CARE MAINTENANCE: ICD-10-CM

## 2019-04-18 DIAGNOSIS — E66.3 OVERWEIGHT (BMI 25.0-29.9): ICD-10-CM

## 2019-04-18 PROBLEM — F12.90 MARIJUANA USE: Status: RESOLVED | Noted: 2018-07-12 | Resolved: 2019-04-18

## 2019-04-18 PROBLEM — R21 SKIN RASH: Status: RESOLVED | Noted: 2018-07-12 | Resolved: 2019-04-18

## 2019-04-18 PROBLEM — M79.602 LEFT ARM PAIN: Status: RESOLVED | Noted: 2018-07-12 | Resolved: 2019-04-18

## 2019-04-18 LAB — HIV 1+2 AB+HIV1 P24 AG SERPL QL IA: NON REACTIVE

## 2019-04-18 PROCEDURE — 36415 COLL VENOUS BLD VENIPUNCTURE: CPT

## 2019-04-18 PROCEDURE — 99213 OFFICE O/P EST LOW 20 MIN: CPT | Mod: GE | Performed by: INTERNAL MEDICINE

## 2019-04-18 PROCEDURE — 87389 HIV-1 AG W/HIV-1&-2 AB AG IA: CPT

## 2019-04-18 RX ORDER — BUSPIRONE HYDROCHLORIDE 10 MG/1
10 TABLET ORAL 2 TIMES DAILY
Qty: 60 TAB | Refills: 0 | Status: SHIPPED | OUTPATIENT
Start: 2019-04-18 | End: 2019-05-22

## 2019-04-18 ASSESSMENT — PAIN SCALES - GENERAL: PAINLEVEL: NO PAIN

## 2019-04-18 ASSESSMENT — PATIENT HEALTH QUESTIONNAIRE - PHQ9
5. POOR APPETITE OR OVEREATING: 0 - NOT AT ALL
SUM OF ALL RESPONSES TO PHQ QUESTIONS 1-9: 7
CLINICAL INTERPRETATION OF PHQ2 SCORE: 2

## 2019-04-18 NOTE — PATIENT INSTRUCTIONS
Generalized Anxiety Disorder  Generalized anxiety disorder (JAYLEN) is a mental disorder. It interferes with life functions, including relationships, work, and school.  JAYLEN is different from normal anxiety, which everyone experiences at some point in their lives in response to specific life events and activities. Normal anxiety actually helps us prepare for and get through these life events and activities. Normal anxiety goes away after the event or activity is over.   JAYLEN causes anxiety that is not necessarily related to specific events or activities. It also causes excess anxiety in proportion to specific events or activities. The anxiety associated with JAYLEN is also difficult to control. JAYLEN can vary from mild to severe. People with severe JAYLEN can have intense waves of anxiety with physical symptoms (panic attacks).   SYMPTOMS  The anxiety and worry associated with JAYLEN are difficult to control. This anxiety and worry are related to many life events and activities and also occur more days than not for 6 months or longer. People with JAYLEN also have three or more of the following symptoms (one or more in children):  · Restlessness.    · Fatigue.  · Difficulty concentrating.    · Irritability.  · Muscle tension.  · Difficulty sleeping or unsatisfying sleep.  DIAGNOSIS  JAYLEN is diagnosed through an assessment by your health care provider. Your health care provider will ask you questions about your mood, physical symptoms, and events in your life. Your health care provider may ask you about your medical history and use of alcohol or drugs, including prescription medicines. Your health care provider may also do a physical exam and blood tests. Certain medical conditions and the use of certain substances can cause symptoms similar to those associated with JAYLEN. Your health care provider may refer you to a mental health specialist for further evaluation.  TREATMENT  The following therapies are usually used to treat JAYLEN:    · Medication. Antidepressant medication usually is prescribed for long-term daily control. Antianxiety medicines may be added in severe cases, especially when panic attacks occur.    · Talk therapy (psychotherapy). Certain types of talk therapy can be helpful in treating JAYLEN by providing support, education, and guidance. A form of talk therapy called cognitive behavioral therapy can teach you healthy ways to think about and react to daily life events and activities.  · Stress management techniques. These include yoga, meditation, and exercise and can be very helpful when they are practiced regularly.  A mental health specialist can help determine which treatment is best for you. Some people see improvement with one therapy. However, other people require a combination of therapies.  This information is not intended to replace advice given to you by your health care provider. Make sure you discuss any questions you have with your health care provider.  Document Released: 04/14/2014 Document Revised: 01/08/2016 Document Reviewed: 04/14/2014  [employer] Interactive Patient Education © 2017 [employer] Inc.

## 2019-04-19 NOTE — PROGRESS NOTES
.  Established Patient    Nato presents today with the following:    CC: f/u anxiety    HPI: 23 yr old male patient with PMHx of anxiety and depression comes in for follow up of anxiety.    #Anxiety and depression:  States he has had problems with depression for many years and currently denies SI or HI.States lexpro is working better and his symptoms improving. Occasionally has anxiety attacks and panic situations.Was prescribed atarax which was helping his anxiety but he was feeling drowsy and sedating. So he stopped taking atarax. Requesting alternative for anxiety.    Patient Active Problem List    Diagnosis Date Noted   • High risk homosexual behavior 10/18/2018   • Anxiety and depression 07/12/2018       Current Outpatient Prescriptions   Medication Sig Dispense Refill   • busPIRone (BUSPAR) 10 MG Tab tablet Take 1 Tab by mouth 2 times a day. 60 Tab 0   • escitalopram (LEXAPRO) 20 MG tablet Take 1 Tab by mouth every day. 90 Tab 1   • Multiple Vitamins-Minerals (MULTIVITAMIN ADULT) Chew Tab Take  by mouth.     • ibuprofen (MOTRIN) 200 MG Tab Take 200 mg by mouth every 6 hours as needed.       No current facility-administered medications for this visit.        Social History     Social History   • Marital status: Single     Spouse name: N/A   • Number of children: N/A   • Years of education: N/A     Occupational History   • Not on file.     Social History Main Topics   • Smoking status: Never Smoker   • Smokeless tobacco: Never Used   • Alcohol use No   • Drug use: Yes     Types: Marijuana      Comment: per pt quit when started on Lexapro, 1-2 months last to [facility] with anxiety - 5mg once a day,    • Sexual activity: Yes     Partners: Male     Other Topics Concern   • Not on file     Social History Narrative   • No narrative on file       Family History   Problem Relation Age of Onset   • Hypertension Mother    • Depression Mother    • Arthritis Mother    • Diabetes Father    • Heart Disease Father    • Alzheimer's  "Disease Father    • Depression Brother    • Dementia Maternal Grandmother    • Dementia Paternal Grandmother    • Cancer Paternal Grandmother    • Diabetes Paternal Grandfather        ROS: As per HPI. Additional pertinent symptoms as noted below.    Constitutional: Denies fever/chills/weight changes.   Eyes: Denies changes/pain in vision  ENT: Denies sore throat/congestion/ear ache.   Cardiovascular: Denies chest pain /palpitations/edema.   Respiratory: Denies SOB/cough/PND/orthopnea  Abdomen: Denies difficulty swallowing/ diarrhea/constipation/abdominal pain/nausea/vomiting  Genitourinary: Normal urinary habits.   Musculo-skeletal: normal ambulation.Denies muscle or joint pains.  Skin: Denies rash/lesions.  Neurological: Denies weakness/tingling/numbness/headache  Psychological: good mood and cooperative. + anxiety        /80 (BP Location: Left arm, Patient Position: Sitting, BP Cuff Size: Adult)   Pulse (!) 114   Temp 37.4 °C (99.4 °F) (Temporal)   Ht 1.702 m (5' 7\")   Wt 75.3 kg (166 lb)   SpO2 95%   BMI 26.00 kg/m²     Physical Exam  General:  Alert and oriented, No apparent distress.    Eyes: Pupils equal and reactive. No scleral icterus.    Throat: Clear no erythema or exudates noted.    Neck: Supple. No lymphadenopathy noted. Thyroid not enlarged.    Lungs: Clear to auscultation and percussion bilaterally.    Cardiovascular: Regular rate and rhythm. No murmurs, rubs or gallops.    Abdomen:  Benign. No rebound or guarding noted.    Extremities: No clubbing, cyanosis, edema.    Skin: Clear. No rash or suspicious skin lesions noted.    Neurological: Oriented to time, place, and person .Cranial nerves intact. No motor/sensory deficits.Reflexes were normal and symmetrical in both upper and lower extremities     Musculoskeletal : NROM of all extremities. No tenderness or deformity noted.     Note: I have reviewed all pertinent labs and diagnostic tests associated with this visit with specific comments " listed under the assessment and plan below      Assessment and Plan    1. Anxiety and depression  Lexpro improving his depression  Stopped taking atarax for anxiety due to sedative effects  Ordered buspar 10 mg 1 tab PO once daily and advise to increase upto twice daily.  PHQ9-7  Denies SI/HI  Referral to psychology ordered     2. Overweight  - advise to eat healthy  -advise to exercise regularly atleast 30 min a day for 5 days a week    3. Health care maintenance  - Influenza-10/18/18. Due next upcoming season  - Due for Tdap- patient said he will consider next upcoming season  - Not due for colonoscopy/DEXA yet  - STD screening- given homosexual sexual history- HIV screening ordered which is pending         Signed by: Dallin Campbell M.D.

## 2019-05-22 ENCOUNTER — OFFICE VISIT (OUTPATIENT)
Dept: INTERNAL MEDICINE | Facility: MEDICAL CENTER | Age: 24
End: 2019-05-22
Payer: COMMERCIAL

## 2019-05-22 VITALS
SYSTOLIC BLOOD PRESSURE: 110 MMHG | OXYGEN SATURATION: 92 % | BODY MASS INDEX: 26.34 KG/M2 | HEART RATE: 91 BPM | TEMPERATURE: 98.2 F | DIASTOLIC BLOOD PRESSURE: 76 MMHG | HEIGHT: 67 IN | WEIGHT: 167.8 LBS

## 2019-05-22 DIAGNOSIS — Z23 ENCOUNTER FOR VACCINATION: ICD-10-CM

## 2019-05-22 DIAGNOSIS — F41.9 ANXIETY AND DEPRESSION: ICD-10-CM

## 2019-05-22 DIAGNOSIS — F32.A ANXIETY AND DEPRESSION: ICD-10-CM

## 2019-05-22 PROCEDURE — 99213 OFFICE O/P EST LOW 20 MIN: CPT | Mod: GE,25 | Performed by: INTERNAL MEDICINE

## 2019-05-22 PROCEDURE — 90471 IMMUNIZATION ADMIN: CPT | Performed by: INTERNAL MEDICINE

## 2019-05-22 PROCEDURE — 90715 TDAP VACCINE 7 YRS/> IM: CPT | Performed by: INTERNAL MEDICINE

## 2019-05-22 RX ORDER — BUSPIRONE HYDROCHLORIDE 10 MG/1
10 TABLET ORAL DAILY
Qty: 90 TAB | Refills: 1 | Status: SHIPPED | OUTPATIENT
Start: 2019-05-22 | End: 2019-06-26

## 2019-05-22 RX ORDER — ESCITALOPRAM OXALATE 20 MG/1
20 TABLET ORAL DAILY
Qty: 90 TAB | Refills: 1 | Status: SHIPPED | OUTPATIENT
Start: 2019-06-12

## 2019-05-22 NOTE — PROGRESS NOTES
Established Patient    Nato presents today with the following:    CC: anxiety and vaccination    HPI: 23 yr old male patient with PMHx of anxiety and depression comes in for follow up of anxiety.     #Anxiety and depression:  States he has had problems with depression for many years and currently denies SI or HI.States lexpro is working better and his symptoms improving. Occasionally was experiencing anxiety attacks and panic situations.Was prescribed atarax which was helping his anxiety but he was feeling drowsy and sedating. So stopped taking atarax. Last visit started on buspar. Patient tried taking 1 tab orally 10 mg  twice daily but experienced side effects night larson so he has been taking 10 mg 1 tab once daily with control of symptoms of anxiety.Patient has upcoming with psychology on 6/4/19.    Patient Active Problem List    Diagnosis Date Noted   • High risk homosexual behavior 10/18/2018   • Anxiety and depression 07/12/2018       Current Outpatient Prescriptions   Medication Sig Dispense Refill   • busPIRone (BUSPAR) 10 MG Tab tablet Take 1 Tab by mouth every day. 90 Tab 1   • [START ON 6/12/2019] escitalopram (LEXAPRO) 20 MG tablet Take 1 Tab by mouth every day. 90 Tab 1   • Multiple Vitamins-Minerals (MULTIVITAMIN ADULT) Chew Tab Take  by mouth.       No current facility-administered medications for this visit.        Social History     Social History   • Marital status: Single     Spouse name: N/A   • Number of children: N/A   • Years of education: N/A     Occupational History   • Not on file.     Social History Main Topics   • Smoking status: Never Smoker   • Smokeless tobacco: Never Used   • Alcohol use No   • Drug use: No      Comment: per pt quit when started on Lexapro, 1-2 months last to [facility] with anxiety - 5mg once a day,    • Sexual activity: Yes     Partners: Male     Other Topics Concern   • Not on file     Social History Narrative   • No narrative on file       Family History   Problem  "Relation Age of Onset   • Hypertension Mother    • Depression Mother    • Arthritis Mother    • Diabetes Father    • Heart Disease Father    • Alzheimer's Disease Father    • Depression Brother    • Dementia Maternal Grandmother    • Dementia Paternal Grandmother    • Cancer Paternal Grandmother    • Diabetes Paternal Grandfather        ROS: As per HPI. Additional pertinent symptoms as noted below.    Negative except mentioned in HPI.    /76 (BP Location: Right arm, Patient Position: Sitting, BP Cuff Size: Adult)   Pulse 91   Temp 36.8 °C (98.2 °F) (Temporal)   Ht 1.702 m (5' 7\")   Wt 76.1 kg (167 lb 12.8 oz)   SpO2 92%   BMI 26.28 kg/m²     Physical Exam  General:  Alert and oriented, No apparent distress.    Eyes: Pupils equal and reactive. No scleral icterus.    Throat: Clear no erythema or exudates noted.    Neck: Supple. No lymphadenopathy noted. Thyroid not enlarged.    Lungs: Clear to auscultation and percussion bilaterally.    Cardiovascular: Regular rate and rhythm. No murmurs, rubs or gallops.    Abdomen:  Benign. No rebound or guarding noted.    Extremities: No clubbing, cyanosis, edema.    Skin: Clear. No rash or suspicious skin lesions noted.    Neurological: Oriented to time, place, and person .Cranial nerves intact. No motor/sensory deficits.Reflexes were normal and symmetrical in both upper and lower extremities     Musculoskeletal : NROM of all extremities. No tenderness or deformity noted.     Note: I have reviewed all pertinent labs and diagnostic tests associated with this visit with specific comments listed under the assessment and plan below      Assessment and Plan    1. Anxiety and depression  Lexpro improving his depression.  Stopped taking atarax for anxiety due to sedative effects.  Last visit started taking buspar 10 mg 1 tab PO once daily with good control of symptoms. Patient was offered 5 mg 1 tab twice daily if needed as half life of this medication is short.  Denies " SI/HI.  Referral to psychology was ordered last visit and has upcoming appointment scheduled on 6/4/19.    2. Encounter for vaccination  Due for Tdap-ordered and administered.      Signed by: Dallin Campbell M.D.

## 2019-05-22 NOTE — NON-PROVIDER
"Nato Calvillo is a 23 y.o. male here for a non-provider visit for:   TDAP    Reason for immunization: Overdue/Provider Recommended  Immunization records indicate need for vaccine: Yes, confirmed with Epic  Minimum interval has been met for this vaccine: Yes  ABN completed: Not Indicated    Order and dose verified by: Alysa BURGESS Dated  02/24/15 was given to patient: Yes  All IAC Questionnaire questions were answered \"No.\"    Patient tolerated injection and no adverse effects were observed or reported: Yes    Pt scheduled for next dose in series: Not Indicated    "

## 2019-05-22 NOTE — PATIENT INSTRUCTIONS
"DTaP Vaccine (Diphtheria, Tetanus, and Pertussis): What You Need to Know  1. Why get vaccinated?  Diphtheria, tetanus, and pertussis are serious diseases caused by bacteria. Diphtheria and pertussis are spread from person to person. Tetanus enters the body through cuts or wounds.  DIPHTHERIA causes a thick covering in the back of the throat.  · It can lead to breathing problems, paralysis, heart failure, and even death.  TETANUS (Lockjaw) causes painful tightening of the muscles, usually all over the body.  · It can lead to \"locking\" of the jaw so the victim cannot open his mouth or swallow. Tetanus leads to death in up to 2 out of 10 cases.  PERTUSSIS (Whooping Cough) causes coughing spells so bad that it is hard for infants to eat, drink, or breathe. These spells can last for weeks.  · It can lead to pneumonia, seizures (jerking and staring spells), brain damage, and death.  Diphtheria, tetanus, and pertussis vaccine (DTaP) can help prevent these diseases. Most children who are vaccinated with DTaP will be protected throughout childhood. Many more children would get these diseases if we stopped vaccinating.  DTaP is a safer version of an older vaccine called DTP. DTP is no longer used in the [place].  2. Who should get DTaP vaccine and when?  Children should get 5 doses of DTaP vaccine, one dose at each of the following ages:  · 2 months  · 4 months  · 6 months  · 15-18 months  · 4-6 years  DTaP may be given at the same time as other vaccines.  3. Some children should not get DTaP vaccine or should wait  · Children with minor illnesses, such as a cold, may be vaccinated. But children who are moderately or severely ill should usually wait until they recover before getting DTaP vaccine.  · Any child who had a life-threatening allergic reaction after a dose of DTaP should not get another dose.  · Any child who suffered a brain or nervous system disease within 7 days after a dose of DTaP should not get another " dose.  · Talk with your doctor if your child:  ¨ had a seizure or collapsed after a dose of DTaP,  ¨ cried non-stop for 3 hours or more after a dose of DTaP,  ¨ had a fever over 105°F after a dose of DTaP.  Ask your doctor for more information. Some of these children should not get another dose of pertussis vaccine, but may get a vaccine without pertussis, called DT.  4. Older children and adults  DTaP is not licensed for adolescents, adults, or children 7 years of age and older.  But older people still need protection. A vaccine called Tdap is similar to DTaP. A single dose of Tdap is recommended for people 11 through 64 years of age. Another vaccine, called Td, protects against tetanus and diphtheria, but not pertussis. It is recommended every 10 years. There are separate Vaccine Information Statements for these vaccines.  5. What are the risks from DTaP vaccine?  Getting diphtheria, tetanus, or pertussis disease is much riskier than getting DTaP vaccine.  However, a vaccine, like any medicine, is capable of causing serious problems, such as severe allergic reactions. The risk of DTaP vaccine causing serious harm, or death, is extremely small.  Mild problems (common)  · Fever (up to about 1 child in 4)  · Redness or swelling where the shot was given (up to about 1 child in 4)  · Soreness or tenderness where the shot was given (up to about 1 child in 4)  These problems occur more often after the 4th and 5th doses of the DTaP series than after earlier doses. Sometimes the 4th or 5th dose of DTaP vaccine is followed by swelling of the entire arm or leg in which the shot was given, lasting 1-7 days (up to about 1 child in 30).  Other mild problems include:  · Fussiness (up to about 1 child in 3)  · Tiredness or poor appetite (up to about 1 child in 10)  · Vomiting (up to about 1 child in 50)  These problems generally occur 1-3 days after the shot.  Moderate problems (uncommon)  · Seizure (jerking or staring) (about 1  child out of 14,000)  · Non-stop crying, for 3 hours or more (up to about 1 child out of 1,000)  · High fever, over 105°F (about 1 child out of 16,000)  Severe problems (very rare)  · Serious allergic reaction (less than 1 out of a million doses)  · Several other severe problems have been reported after DTaP vaccine. These include:  ¨ Long-term seizures, coma, or lowered consciousness  ¨ Permanent brain damage.  These are so rare it is hard to tell if they are caused by the vaccine.  Controlling fever is especially important for children who have had seizures, for any reason. It is also important if another family member has had seizures. You can reduce fever and pain by giving your child an aspirin-free pain reliever when the shot is given, and for the next 24 hours, following the package instructions.  6. What if there is a serious reaction?  What should I look for?  Look for anything that concerns you, such as signs of a severe allergic reaction, very high fever, or behavior changes.  Signs of a severe allergic reaction can include hives, swelling of the face and throat, difficulty breathing, a fast heartbeat, dizziness, and weakness. These would start a few minutes to a few hours after the vaccination.  What should I do?  · If you think it is a severe allergic reaction or other emergency that can't wait, call 9-1-1 or get the person to the nearest hospital. Otherwise, call your doctor.  · Afterward, the reaction should be reported to the Vaccine Adverse Event Reporting System (VAERS). Your doctor might file this report, or you can do it yourself through the VAERS web site at www.vaers.hhs.gov, or by calling 1-301.920.1634.  ¨ VAERS is only for reporting reactions. They do not give medical advice.  7. The National Vaccine Injury Compensation Program  The National Vaccine Injury Compensation Program (VICP) is a federal program that was created to compensate people who may have been injured by certain  vaccines.  Persons who believe they may have been injured by a vaccine can learn about the program and about filing a claim by calling 1-762.703.1613 or visiting the VIC website at www.[facility].gov/vaccinecompensation.  8. How can I learn more?  · Ask your doctor.  · Call your local or state health department.  · Contact the Centers for Disease Control and Prevention (CDC):  ¨ Call 1-671.710.9612 (5-626-HQL-INFO) or  ¨ Visit CDC's website at www.cdc.gov/vaccines  CDC DTaP Vaccine (Diphtheria, Tetanus, and Pertussis) VIS (5/17/07)  This information is not intended to replace advice given to you by your health care provider. Make sure you discuss any questions you have with your health care provider.  Document Released: 10/15/2007 Document Revised: 09/07/2017 Document Reviewed: 09/07/2017  Elsevier Interactive Patient Education © 2017 [employer] Inc.

## 2019-06-26 ENCOUNTER — OFFICE VISIT (OUTPATIENT)
Dept: INTERNAL MEDICINE | Facility: MEDICAL CENTER | Age: 24
End: 2019-06-26
Payer: COMMERCIAL

## 2019-06-26 VITALS
WEIGHT: 169 LBS | OXYGEN SATURATION: 97 % | HEIGHT: 67 IN | TEMPERATURE: 98.1 F | SYSTOLIC BLOOD PRESSURE: 122 MMHG | BODY MASS INDEX: 26.53 KG/M2 | DIASTOLIC BLOOD PRESSURE: 74 MMHG | RESPIRATION RATE: 18 BRPM | HEART RATE: 84 BPM

## 2019-06-26 DIAGNOSIS — F41.9 ANXIETY AND DEPRESSION: ICD-10-CM

## 2019-06-26 DIAGNOSIS — E66.3 OVERWEIGHT (BMI 25.0-29.9): ICD-10-CM

## 2019-06-26 DIAGNOSIS — F32.A ANXIETY AND DEPRESSION: ICD-10-CM

## 2019-06-26 PROCEDURE — 99213 OFFICE O/P EST LOW 20 MIN: CPT | Mod: GE | Performed by: INTERNAL MEDICINE

## 2019-06-26 RX ORDER — BUSPIRONE HYDROCHLORIDE 5 MG/1
5 TABLET ORAL 3 TIMES DAILY
Qty: 90 TAB | Refills: 2 | Status: SHIPPED | OUTPATIENT
Start: 2019-06-26

## 2019-06-26 NOTE — PATIENT INSTRUCTIONS
Generalized Anxiety Disorder  Generalized anxiety disorder (JAYLEN) is a mental disorder. It interferes with life functions, including relationships, work, and school.  JAYLEN is different from normal anxiety, which everyone experiences at some point in their lives in response to specific life events and activities. Normal anxiety actually helps us prepare for and get through these life events and activities. Normal anxiety goes away after the event or activity is over.   JAYLEN causes anxiety that is not necessarily related to specific events or activities. It also causes excess anxiety in proportion to specific events or activities. The anxiety associated with JAYLEN is also difficult to control. JAYLEN can vary from mild to severe. People with severe JAYLEN can have intense waves of anxiety with physical symptoms (panic attacks).   SYMPTOMS  The anxiety and worry associated with JAYLEN are difficult to control. This anxiety and worry are related to many life events and activities and also occur more days than not for 6 months or longer. People with JAYLEN also have three or more of the following symptoms (one or more in children):  · Restlessness.    · Fatigue.  · Difficulty concentrating.    · Irritability.  · Muscle tension.  · Difficulty sleeping or unsatisfying sleep.  DIAGNOSIS  JAYLEN is diagnosed through an assessment by your health care provider. Your health care provider will ask you questions about your mood, physical symptoms, and events in your life. Your health care provider may ask you about your medical history and use of alcohol or drugs, including prescription medicines. Your health care provider may also do a physical exam and blood tests. Certain medical conditions and the use of certain substances can cause symptoms similar to those associated with JAYLEN. Your health care provider may refer you to a mental health specialist for further evaluation.  TREATMENT  The following therapies are usually used to treat JAYLEN:    · Medication. Antidepressant medication usually is prescribed for long-term daily control. Antianxiety medicines may be added in severe cases, especially when panic attacks occur.    · Talk therapy (psychotherapy). Certain types of talk therapy can be helpful in treating JAYLEN by providing support, education, and guidance. A form of talk therapy called cognitive behavioral therapy can teach you healthy ways to think about and react to daily life events and activities.  · Stress management techniques. These include yoga, meditation, and exercise and can be very helpful when they are practiced regularly.  A mental health specialist can help determine which treatment is best for you. Some people see improvement with one therapy. However, other people require a combination of therapies.  This information is not intended to replace advice given to you by your health care provider. Make sure you discuss any questions you have with your health care provider.  Document Released: 04/14/2014 Document Revised: 01/08/2016 Document Reviewed: 04/14/2014  [employer] Interactive Patient Education © 2017 [employer]vier Inc.

## 2019-06-26 NOTE — PROGRESS NOTES
Established Patient    Nato presents today with the following:    CC: anxiety    HPI: 23 yr old male patient with PMHx of anxiety and depression comes in for follow up of anxiety.     #Anxiety and depression:  States he has had problems with depression for many years and currently denies SI or HI.States lexpro is working better and his symptoms improving. Occasionally was experiencing anxiety attacks and panic situations.Was prescribed atarax which was helping his anxiety but he was feeling drowsy and sedating. So stopped taking atarax.  In prior visits started on buspar. Patient tried taking 1 tab orally 10 mg  twice daily but experienced side effects night larson so he has been taking 10 mg 1 tab once daily with control of symptoms of anxiety.Patient did not follow-up with psychology though referral was given in the past.  Patient states since last visit he continues to have occasional nightmares even after taking 10 mg 1 tablet once daily.  Patient sleeps at 1 pm daily and takes medications both lexapro and buspar at 1 PM.      Patient Active Problem List    Diagnosis Date Noted   • High risk homosexual behavior 10/18/2018   • Anxiety and depression 07/12/2018       Current Outpatient Prescriptions   Medication Sig Dispense Refill   • busPIRone (BUSPAR) 5 MG tablet Take 1 Tab by mouth 3 times a day. 90 Tab 2   • escitalopram (LEXAPRO) 20 MG tablet Take 1 Tab by mouth every day. 90 Tab 1   • Multiple Vitamins-Minerals (MULTIVITAMIN ADULT) Chew Tab Take  by mouth.       No current facility-administered medications for this visit.        Social History     Social History   • Marital status: Single     Spouse name: N/A   • Number of children: N/A   • Years of education: N/A     Occupational History   • Not on file.     Social History Main Topics   • Smoking status: Never Smoker   • Smokeless tobacco: Never Used   • Alcohol use No   • Drug use: No      Comment: per pt quit when started on Lexapro, 1-2 months last to  "helkp with anxiety - 5mg once a day,    • Sexual activity: Yes     Partners: Male     Other Topics Concern   • Not on file     Social History Narrative   • No narrative on file       Family History   Problem Relation Age of Onset   • Hypertension Mother    • Depression Mother    • Arthritis Mother    • Diabetes Father    • Heart Disease Father    • Alzheimer's Disease Father    • Depression Brother    • Dementia Maternal Grandmother    • Dementia Paternal Grandmother    • Cancer Paternal Grandmother    • Diabetes Paternal Grandfather        ROS: As per HPI. Additional pertinent symptoms as noted below.    Constitutional: Denies fever/chills/weight changes.   Eyes: Denies changes/pain in vision  ENT: Denies sore throat/congestion/ear ache.   Cardiovascular: Denies chest pain /palpitations/edema.   Respiratory: Denies SOB/cough/PND/orthopnea  Abdomen: Denies difficulty swallowing/ diarrhea/constipation/abdominal pain/nausea/vomiting  Genitourinary: Normal urinary habits.   Musculo-skeletal: normal ambulation.Denies muscle or joint pains.  Skin: Denies rash/lesions.  Neurological: Denies weakness/tingling/numbness/headache  Psychological: good mood and cooperative.        /74 (BP Location: Left arm, Patient Position: Sitting, BP Cuff Size: Adult)   Pulse 84   Temp 36.7 °C (98.1 °F) (Temporal)   Resp 18   Ht 1.702 m (5' 7\")   Wt 76.7 kg (169 lb)   SpO2 97%   BMI 26.47 kg/m²     Physical Exam  General:  Alert and oriented, No apparent distress.    Eyes: Pupils equal and reactive. No scleral icterus.    Throat: Clear no erythema or exudates noted.    Neck: Supple. No lymphadenopathy noted. Thyroid not enlarged.    Lungs: Clear to auscultation and percussion bilaterally.    Cardiovascular: Regular rate and rhythm. No murmurs, rubs or gallops.    Abdomen:  Benign. No rebound or guarding noted.    Extremities: No clubbing, cyanosis, edema.    Skin: Clear. No rash or suspicious skin lesions " noted.    Neurological: Oriented to time, place, and person .Cranial nerves intact. No motor/sensory deficits.Reflexes were normal and symmetrical in both upper and lower extremities     Musculoskeletal : NROM of all extremities. No tenderness or deformity noted.     Note: I have reviewed all pertinent labs and diagnostic tests associated with this visit with specific comments listed under the assessment and plan below    Assessment and Plan    1. Anxiety and depression  Lexpro improving his depression.  Stopped taking atarax for anxiety due to sedative effects.  Prior visits started taking buspar 10 mg 1 tab PO once daily with good control of symptoms but occasional nightmares .Advised patient to take BuSpar 5 mg 1 tablet p.o. 3 times daily .Ordered today 5mg tab today.   Denies SI/HI.  Referral to psychology was ordered in prior visits but patient did not follow up with them.Educated and counseled.    2. Overweight (BMI 25.0-29.9)  Advised to eat healthy  Advised to exercise regularly at least 30 minutes a day for 5 days a week    Signed by: Dallin Campbell M.D.

## 2020-01-28 ENCOUNTER — HOSPITAL ENCOUNTER (OUTPATIENT)
Dept: LAB | Facility: MEDICAL CENTER | Age: 25
End: 2020-01-28
Attending: FAMILY MEDICINE
Payer: COMMERCIAL

## 2020-01-28 LAB
ALBUMIN SERPL BCP-MCNC: 5 G/DL (ref 3.2–4.9)
ALBUMIN/GLOB SERPL: 1.6 G/DL
ALP SERPL-CCNC: 67 U/L (ref 30–99)
ALT SERPL-CCNC: 39 U/L (ref 2–50)
ANION GAP SERPL CALC-SCNC: 11 MMOL/L (ref 0–11.9)
AST SERPL-CCNC: 23 U/L (ref 12–45)
BASOPHILS # BLD AUTO: 0.7 % (ref 0–1.8)
BASOPHILS # BLD: 0.05 K/UL (ref 0–0.12)
BILIRUB SERPL-MCNC: 0.9 MG/DL (ref 0.1–1.5)
BUN SERPL-MCNC: 16 MG/DL (ref 8–22)
CALCIUM SERPL-MCNC: 9.9 MG/DL (ref 8.5–10.5)
CHLORIDE SERPL-SCNC: 103 MMOL/L (ref 96–112)
CHOLEST SERPL-MCNC: 190 MG/DL (ref 100–199)
CO2 SERPL-SCNC: 27 MMOL/L (ref 20–33)
CREAT SERPL-MCNC: 1.13 MG/DL (ref 0.5–1.4)
EOSINOPHIL # BLD AUTO: 0.09 K/UL (ref 0–0.51)
EOSINOPHIL NFR BLD: 1.3 % (ref 0–6.9)
ERYTHROCYTE [DISTWIDTH] IN BLOOD BY AUTOMATED COUNT: 41.5 FL (ref 35.9–50)
EST. AVERAGE GLUCOSE BLD GHB EST-MCNC: 114 MG/DL
ESTRADIOL SERPL-MCNC: 52 PG/ML
FASTING STATUS PATIENT QL REPORTED: NORMAL
GLOBULIN SER CALC-MCNC: 3.1 G/DL (ref 1.9–3.5)
GLUCOSE SERPL-MCNC: 96 MG/DL (ref 65–99)
HBA1C MFR BLD: 5.6 % (ref 0–5.6)
HCT VFR BLD AUTO: 49.5 % (ref 42–52)
HDLC SERPL-MCNC: 43 MG/DL
HGB BLD-MCNC: 16.2 G/DL (ref 14–18)
IMM GRANULOCYTES # BLD AUTO: 0.01 K/UL (ref 0–0.11)
IMM GRANULOCYTES NFR BLD AUTO: 0.1 % (ref 0–0.9)
LDLC SERPL CALC-MCNC: 123 MG/DL
LYMPHOCYTES # BLD AUTO: 2.22 K/UL (ref 1–4.8)
LYMPHOCYTES NFR BLD: 32.1 % (ref 22–41)
MCH RBC QN AUTO: 30.4 PG (ref 27–33)
MCHC RBC AUTO-ENTMCNC: 32.7 G/DL (ref 33.7–35.3)
MCV RBC AUTO: 92.9 FL (ref 81.4–97.8)
MONOCYTES # BLD AUTO: 0.61 K/UL (ref 0–0.85)
MONOCYTES NFR BLD AUTO: 8.8 % (ref 0–13.4)
NEUTROPHILS # BLD AUTO: 3.94 K/UL (ref 1.82–7.42)
NEUTROPHILS NFR BLD: 57 % (ref 44–72)
NRBC # BLD AUTO: 0 K/UL
NRBC BLD-RTO: 0 /100 WBC
PLATELET # BLD AUTO: 309 K/UL (ref 164–446)
PMV BLD AUTO: 10.2 FL (ref 9–12.9)
POTASSIUM SERPL-SCNC: 4 MMOL/L (ref 3.6–5.5)
PROT SERPL-MCNC: 8.1 G/DL (ref 6–8.2)
RBC # BLD AUTO: 5.33 M/UL (ref 4.7–6.1)
SODIUM SERPL-SCNC: 141 MMOL/L (ref 135–145)
TESTOST SERPL-MCNC: 448 NG/DL (ref 175–781)
TRIGL SERPL-MCNC: 118 MG/DL (ref 0–149)
TSH SERPL DL<=0.005 MIU/L-ACNC: 1.31 UIU/ML (ref 0.38–5.33)
WBC # BLD AUTO: 6.9 K/UL (ref 4.8–10.8)

## 2020-01-28 PROCEDURE — 80061 LIPID PANEL: CPT

## 2020-01-28 PROCEDURE — 82670 ASSAY OF TOTAL ESTRADIOL: CPT

## 2020-01-28 PROCEDURE — 85025 COMPLETE CBC W/AUTO DIFF WBC: CPT

## 2020-01-28 PROCEDURE — 83036 HEMOGLOBIN GLYCOSYLATED A1C: CPT

## 2020-01-28 PROCEDURE — 80053 COMPREHEN METABOLIC PANEL: CPT

## 2020-01-28 PROCEDURE — 84403 ASSAY OF TOTAL TESTOSTERONE: CPT

## 2020-01-28 PROCEDURE — 82306 VITAMIN D 25 HYDROXY: CPT

## 2020-01-28 PROCEDURE — 84443 ASSAY THYROID STIM HORMONE: CPT

## 2020-01-28 PROCEDURE — 36415 COLL VENOUS BLD VENIPUNCTURE: CPT

## 2020-01-29 LAB — 25(OH)D3 SERPL-MCNC: 14 NG/ML (ref 30–100)

## 2020-04-23 ENCOUNTER — HOSPITAL ENCOUNTER (OUTPATIENT)
Dept: LAB | Facility: MEDICAL CENTER | Age: 25
End: 2020-04-23
Attending: FAMILY MEDICINE
Payer: COMMERCIAL

## 2020-04-23 LAB
25(OH)D3 SERPL-MCNC: 70 NG/ML (ref 30–100)
ESTRADIOL SERPL-MCNC: 95.9 PG/ML
TESTOST SERPL-MCNC: 26 NG/DL (ref 175–781)

## 2020-04-23 PROCEDURE — 36415 COLL VENOUS BLD VENIPUNCTURE: CPT

## 2020-04-23 PROCEDURE — 84403 ASSAY OF TOTAL TESTOSTERONE: CPT

## 2020-04-23 PROCEDURE — 82670 ASSAY OF TOTAL ESTRADIOL: CPT

## 2020-04-23 PROCEDURE — 82306 VITAMIN D 25 HYDROXY: CPT

## 2020-07-21 ENCOUNTER — HOSPITAL ENCOUNTER (OUTPATIENT)
Dept: LAB | Facility: MEDICAL CENTER | Age: 25
End: 2020-07-21
Attending: FAMILY MEDICINE
Payer: COMMERCIAL

## 2020-07-21 LAB
ANION GAP SERPL CALC-SCNC: 13 MMOL/L (ref 7–16)
BUN SERPL-MCNC: 10 MG/DL (ref 8–22)
CALCIUM SERPL-MCNC: 9.7 MG/DL (ref 8.5–10.5)
CHLORIDE SERPL-SCNC: 103 MMOL/L (ref 96–112)
CO2 SERPL-SCNC: 23 MMOL/L (ref 20–33)
CREAT SERPL-MCNC: 0.96 MG/DL (ref 0.5–1.4)
ESTRADIOL SERPL-MCNC: 53.7 PG/ML
GLUCOSE SERPL-MCNC: 104 MG/DL (ref 65–99)
POTASSIUM SERPL-SCNC: 4 MMOL/L (ref 3.6–5.5)
SODIUM SERPL-SCNC: 139 MMOL/L (ref 135–145)
TESTOST SERPL-MCNC: 151 NG/DL (ref 175–781)

## 2020-07-21 PROCEDURE — 36415 COLL VENOUS BLD VENIPUNCTURE: CPT

## 2020-07-21 PROCEDURE — 82670 ASSAY OF TOTAL ESTRADIOL: CPT

## 2020-07-21 PROCEDURE — 84403 ASSAY OF TOTAL TESTOSTERONE: CPT

## 2020-07-21 PROCEDURE — 80048 BASIC METABOLIC PNL TOTAL CA: CPT

## 2021-03-10 ENCOUNTER — OFFICE VISIT (OUTPATIENT)
Dept: URGENT CARE | Facility: CLINIC | Age: 26
End: 2021-03-10
Payer: COMMERCIAL

## 2021-03-10 ENCOUNTER — HOSPITAL ENCOUNTER (OUTPATIENT)
Facility: MEDICAL CENTER | Age: 26
End: 2021-03-10
Attending: PHYSICIAN ASSISTANT
Payer: COMMERCIAL

## 2021-03-10 ENCOUNTER — APPOINTMENT (OUTPATIENT)
Dept: RADIOLOGY | Facility: IMAGING CENTER | Age: 26
End: 2021-03-10
Attending: PHYSICIAN ASSISTANT
Payer: COMMERCIAL

## 2021-03-10 ENCOUNTER — TELEPHONE (OUTPATIENT)
Dept: URGENT CARE | Facility: CLINIC | Age: 26
End: 2021-03-10

## 2021-03-10 VITALS
BODY MASS INDEX: 25.73 KG/M2 | SYSTOLIC BLOOD PRESSURE: 124 MMHG | HEIGHT: 68 IN | TEMPERATURE: 98.1 F | OXYGEN SATURATION: 95 % | DIASTOLIC BLOOD PRESSURE: 70 MMHG | WEIGHT: 169.8 LBS | HEART RATE: 97 BPM | RESPIRATION RATE: 16 BRPM

## 2021-03-10 DIAGNOSIS — R07.9 RIGHT-SIDED CHEST PAIN: ICD-10-CM

## 2021-03-10 LAB — D DIMER PPP IA.FEU-MCNC: <0.27 UG/ML (FEU) (ref 0–0.5)

## 2021-03-10 PROCEDURE — 93000 ELECTROCARDIOGRAM COMPLETE: CPT | Performed by: PHYSICIAN ASSISTANT

## 2021-03-10 PROCEDURE — 99214 OFFICE O/P EST MOD 30 MIN: CPT | Performed by: PHYSICIAN ASSISTANT

## 2021-03-10 PROCEDURE — 85379 FIBRIN DEGRADATION QUANT: CPT

## 2021-03-10 PROCEDURE — 71046 X-RAY EXAM CHEST 2 VIEWS: CPT | Mod: TC | Performed by: PHYSICIAN ASSISTANT

## 2021-03-10 RX ORDER — ESTRADIOL 0.1 MG/D
1 FILM, EXTENDED RELEASE TRANSDERMAL
COMMUNITY

## 2021-03-10 RX ORDER — BREXPIPRAZOLE 1 MG/1
1 TABLET ORAL
COMMUNITY

## 2021-03-10 RX ORDER — SPIRONOLACTONE 50 MG/1
50 TABLET, FILM COATED ORAL 2 TIMES DAILY
COMMUNITY

## 2021-03-10 RX ORDER — BIOTIN 10 MG
TABLET ORAL
COMMUNITY

## 2021-03-10 ASSESSMENT — ENCOUNTER SYMPTOMS
PALPITATIONS: 0
COUGH: 0
SHORTNESS OF BREATH: 0
WHEEZING: 0
MUSCULOSKELETAL NEGATIVE: 1
CONSTITUTIONAL NEGATIVE: 1
NEUROLOGICAL NEGATIVE: 1
GASTROINTESTINAL NEGATIVE: 1

## 2021-03-10 ASSESSMENT — FIBROSIS 4 INDEX: FIB4 SCORE: 0.29

## 2021-03-10 NOTE — PROGRESS NOTES
Subjective:   Nato Calvillo is a 25 y.o. male who presents for Chest Pain (Right breast pain x 2 days, breathing in makes it more sore)      HPI  Patient is a 25-year-old male to female trans who presents to the clinic with complaints of right upper chest pain onset 2 days ago.  She describes the chest pain as a sharp pain coming from the inside worse with deep breaths.  Denies any trauma or injury or change in physical activity.  Currently, her pain is 3/10 in severity and a 6/10 with deep breaths.  She denies any pain or swelling to his legs, fevers, chills, cough, shortness of breath, hemoptysis, palpitations, headache, dizziness, abdominal pain, nausea, vomiting, diarrhea.  Denies any recent surgeries.  History of tonsillectomy and adenectomy as a child.  Currently taking estradiol patch x1 year.  Denies history of chest pain.  Grandfather positive history for stroke.  Denies any other family history of pertinent past medical history.    Review of Systems   Constitutional: Negative.    HENT: Negative.    Respiratory: Negative for cough, shortness of breath and wheezing.    Cardiovascular: Positive for chest pain. Negative for palpitations and leg swelling.   Gastrointestinal: Negative.    Musculoskeletal: Negative.    Skin: Negative.    Neurological: Negative.        Medications:    • busPIRone  • escitalopram  • estradiol Pttw  • Multivitamin Adult Chew  • Rexulti Tabs  • spironolactone Tabs    Allergies: Amoxicillin    Problem List: Nato Calvillo has Anxiety and depression and High risk homosexual behavior on their problem list.    Surgical History:  Past Surgical History:   Procedure Laterality Date   • TONSILLECTOMY AND ADENOIDECTOMY         Past Social Hx: Nato Calvillo  reports that he has never smoked. He has never used smokeless tobacco. He reports previous alcohol use. He reports that he does not use drugs.     Past Family Hx:  Nato Calvillo family history includes Alzheimer's  "Disease in his father; Arthritis in his mother; Cancer in his paternal grandmother; Dementia in his maternal grandmother and paternal grandmother; Depression in his brother and mother; Diabetes in his father and paternal grandfather; Heart Disease in his father; Hypertension in his mother.     Problem list, medications, and allergies reviewed by myself today in Epic.     Objective:     /70 (BP Location: Left arm, Patient Position: Sitting, BP Cuff Size: Adult)   Pulse 97   Temp 36.7 °C (98.1 °F) (Temporal)   Resp 16   Ht 1.727 m (5' 8\")   Wt 77 kg (169 lb 12.8 oz)   SpO2 95%   BMI 25.82 kg/m²     Physical Exam  Vitals reviewed.   Constitutional:       General: He is not in acute distress.     Appearance: Normal appearance. He is not ill-appearing or toxic-appearing.   HENT:      Head: Normocephalic.      Nose: Nose normal.   Eyes:      Conjunctiva/sclera: Conjunctivae normal.      Pupils: Pupils are equal, round, and reactive to light.   Cardiovascular:      Rate and Rhythm: Normal rate and regular rhythm.      Pulses:           Radial pulses are 2+ on the right side and 2+ on the left side.      Heart sounds: Normal heart sounds.   Pulmonary:      Effort: Pulmonary effort is normal. No respiratory distress.      Breath sounds: Normal breath sounds. No wheezing, rhonchi or rales.   Chest:      Chest wall: No mass, deformity, swelling, crepitus or edema.          Comments: Minimal TTP to chest wall.   Musculoskeletal:      Cervical back: Normal range of motion and neck supple. No rigidity or tenderness.      Right lower leg: No edema.      Left lower leg: No edema.   Lymphadenopathy:      Cervical: No cervical adenopathy.   Skin:     General: Skin is warm and dry.      Capillary Refill: Capillary refill takes less than 2 seconds.   Neurological:      General: No focal deficit present.      Mental Status: He is alert and oriented to person, place, and time.   Psychiatric:         Mood and Affect: Mood " normal.         Behavior: Behavior normal.       DX: Chest   COMPARISON:  None.     FINDINGS:     No pulmonary infiltrates or consolidations are noted.  No pleural effusions, no pneumothorax are appreciated.  Normal cardiopericardial silhouette.        IMPRESSION:        1. No active cardiopulmonary abnormalities are identified.    EKG Interpretation:  Interpreted by me    Rhythm:  Normal sinus rhythm   Rate:86  Axis: normal  Conduction: normal  ST Segments: no acute change  T Waves: no acute change  Q Waves: none  Clinical Impression: Normal EKG without acute changes. No previous for comparison.      Assessment/Associated Orders     1. Right-sided chest pain  EKG - Clinic Performed    DX-CHEST-2 VIEWS    D-DIMER    CANCELED: DX-CHEST-2 VIEWS       Medical Decision Making      This is a well-appearing 25-year-old male to female trans complains of right-sided chest pain onset 2 days ago.  Pain is worse with deep breaths.  There is no trauma or injury.  Denies any significant personal past medical history.  Does have a history of grandfather having a stroke.  No history of DVT.  Patient is well-appearing.  Exam is benign.  There is some mild tenderness to palpation to the right upper chest wall with no crepitus or deformity.  There is some breast tissue growth bilaterally and equal.  Normal right-sided breast exam.     EKG normal here in the clinic interpreted by myself.   Chest x-ray normal here in the clinic. Results per radiologist interpretation above. I personally reviewed images and radiologist report.     At this point in time, my suspicion for heart etiology is very low.  PE Wells score of 0, therefore low risk for PE.  Pt does take estradiol patch. We will add on D-dimer.  If D-dimer is elevated, patient will be called and instructed to present to the emergency room for further evaluation and treatment.  Patient was also directed to the emergency room if any worsening chest pain, shortness of breath,  dizziness, or any other concerns.  We will call back with D-dimer results and further instructions.    Discussed most likely a costochondritis versus pleurisy.  Recommend ice application, rest, ibuprofen, avoid heavy lifting or strenuous activity at this time.    I personally reviewed prior external notes and test results pertinent to today's visit. Red flags discussed and indications to immediately call 911 or present to the Emergency Department.   Supportive care, differential diagnoses, and indications for immediate follow-up discussed with patient.    Patient expresses understanding and agrees to plan. Patient denies any other questions or concerns.     Advised the patient to follow-up with the primary care physician for recheck, reevaluation, and consideration of further management.    1 undiagnosed new problem with uncertain prognosis.    Please note that this dictation was created using voice recognition software. I have made a reasonable attempt to correct obvious errors, but I expect that there are errors of grammar and possibly content that I did not discover before finalizing the note.    This note was electronically signed by Brian Verma PA-C

## 2021-03-11 NOTE — TELEPHONE ENCOUNTER
Spoke to the patient on the phone regarding Negative D-Dimer. Thus, Negative D-Dimer result with a clinical assessment of low probability has shows to have high negative predictive value for DVT or PE.     Discussed most likely a costochondritis at this point in time. Treatment discussed such as Ice application, Ibuprofen, avoid heavy lifting or strenuous activity.     Return if not improving or any other concerns. Present to the ER if any worsening chest pain, fever, SOB, or any other severe concerns.     The patient verbalized understanding and agreement.  Patient had no further questions or concerns.  The patient appreciated the call.

## 2021-05-22 ENCOUNTER — HOSPITAL ENCOUNTER (OUTPATIENT)
Dept: LAB | Facility: MEDICAL CENTER | Age: 26
End: 2021-05-22
Attending: FAMILY MEDICINE
Payer: COMMERCIAL

## 2021-05-22 LAB
ANION GAP SERPL CALC-SCNC: 12 MMOL/L (ref 7–16)
BUN SERPL-MCNC: 12 MG/DL (ref 8–22)
CALCIUM SERPL-MCNC: 9.7 MG/DL (ref 8.5–10.5)
CHLORIDE SERPL-SCNC: 104 MMOL/L (ref 96–112)
CO2 SERPL-SCNC: 26 MMOL/L (ref 20–33)
CREAT SERPL-MCNC: 0.9 MG/DL (ref 0.5–1.4)
GLUCOSE SERPL-MCNC: 118 MG/DL (ref 65–99)
POTASSIUM SERPL-SCNC: 4.1 MMOL/L (ref 3.6–5.5)
SODIUM SERPL-SCNC: 142 MMOL/L (ref 135–145)

## 2021-05-22 PROCEDURE — 84403 ASSAY OF TOTAL TESTOSTERONE: CPT

## 2021-05-22 PROCEDURE — 80048 BASIC METABOLIC PNL TOTAL CA: CPT

## 2021-05-22 PROCEDURE — 82670 ASSAY OF TOTAL ESTRADIOL: CPT

## 2021-05-22 PROCEDURE — 36415 COLL VENOUS BLD VENIPUNCTURE: CPT

## 2021-05-24 LAB
ESTRADIOL SERPL-MCNC: 71 PG/ML
TESTOST SERPL-MCNC: 204 NG/DL (ref 300–1080)

## 2021-10-07 ENCOUNTER — TELEMEDICINE (OUTPATIENT)
Dept: SPEECH THERAPY | Facility: OTHER | Age: 26
End: 2021-10-07
Payer: COMMERCIAL

## 2021-10-07 DIAGNOSIS — R49.9 VOICE AND RESONANCE DISORDER: ICD-10-CM

## 2021-10-07 PROCEDURE — 92507 TX SP LANG VOICE COMM INDIV: CPT | Mod: GT,GC | Performed by: SPEECH-LANGUAGE PATHOLOGIST

## 2021-10-07 NOTE — OP THERAPY DAILY TREATMENT
"Subjective        Due to the ongoing Covid-19 Public Health Emergency (PHE), speech teletherapy services were rendered via a HIPAA-compliant Zoom platform with no less than 25% direct supervision, and 100% availability by a licensed and certified speech-language pathologist.     Ms. Calvillo experienced technical difficulties while logging on to the Zoom session. She demonstrated patience and a calm demeanor while troubleshooting her username and password with the clinician. Ms. Calvillo seemed more comfortable and confident independently demonstrating each exercise. She smiled often while looking happy about her progress in the session since the summer treatment period.     Objective     The following activities were completed from Session 7 of the Transgender Voice and Communication treatment program:     Review homework: Ms. Calvillo completed exercises from the Session 6 homework handout.     Vocal Relaxation: Ms. Calvillo independently completed three vocal relaxation techniques: laryngeal massage, yawn-sigh, and tongue protrusion with phonation of the /i/ phoneme without clinician assistance.    Breath support: Ms. Calvillo independently completed two breath support activities: yoga breathing and snuff-hiss without clinician modeling.    Pitch Modification: Ms. Calvillo's target pitch of 196 Hz from the previous session continued to serve as a guideline for all activities. Ms. Calvillo completed the following pitch modification exercises with minimal clinician models: match target pitch, hum notes 1-2-3-4-5, rest then 5-4-3-2-1, hum notes 1,3,5,3,1, sing notes 1-2-3-4-5, rest then 5-4-3-2-1, sing notes 5-1-4-2-3, sing \"Meet me, Peter, meet me\" and \"Lead me, Peter, lead me\" in ascending then descending order, say novel words and sentences at the target pitch using natural intonation, and read a passage while maintaining her target pitch longer than single sentences.     Resonance: Ms. Calvillo completed " the following resonance exercises with minimal to moderate clinician support through modeling and scaffolding: hum isolated phoneme on single pitch, chant vowel consonant and vowel consonant combinations on a single pitch, say phrases and sentences with natural intonation, and read a passage while maintaining oral-forward resonance longer than single sentences.     Breathiness: Ms. Calvillo completed the following breathiness exercises with minimal to moderate clinician support through modeling and scaffolding: exhale /h/ and /ha/, and maintain a breathy quality in phrases, short sentences, and connected text in a reading passage.      Verbal Communication: Ms. Calvillo completed the following verbal communication exercises with minimal to moderate clinician support through modeling and scaffolding: rising intonation at the end of phrases, pauses in short bursts, and vowel exaggerations in a reading passage.      Nonverbal Communication: Due to time constraints, Ms. Calvillo did not practice nonverbal communication exercises during the session.     Homework: Ms. Calvillo was instructed to complete exercises provided in the Session 7 Homework handout. She was also instructed to video and audio record herself during practice to increase her personal perception of changing vocal aspects.     Assessment    Review homework: Ms. Calvillo stated she completed the homework exercises from the previous session with ease. She reportedly does not have a full-length mirror to visualize the lower half of her body for nonverbal aspects of communication.     Vocal Relaxation: Ms. Calvillo reported ease while independently completing three vocal relaxation techniques: laryngeal massage, yawn-sigh, and tongue protrusion with phonation of the /i/ phoneme without clinician assistance.    Breath support: Ms. Calvillo reported ease while independently completing two breath support activities: yoga breathing and snuff-hiss without  clinician modeling.    Pitch Modification: Overall, Ms. Calvillo demonstrated good pitch variation and maintained her target pitch throughout various activities Ms. Calvillo reported ease independently completing the pitch modification exercises singing notes, words, and phrases at the target pitch with natural intonation. She reported minor difficulty maintaining her target pitch for multiple sentences in a reading passage.    Resonance: Ms. Calvillo reported the following resonance exercises remain challenging to practice without clinician support of modeling and scaffolding: hum isolated phoneme on single pitch, chant vowel consonant and vowel consonant combinations on a single pitch, say phrases and sentences with natural intonation, and read a passage while maintaining oral-forward resonance longer than single sentences. She identified when she lost the oral-forward resonance when speaking and self-corrected.     Breathiness: Ms. Calvillo reported the following breathiness exercises remain challenging to practice without clinician support through modeling and scaffolding: exhale /h/ and /ha/, and maintain a breathy quality in phrases, short sentences, and connected text in a reading passage. She expressed continued difficulty because she perceived a breathy vocal quality as the most unnatural aspect of voice to practice. Overall, her vocal quality during the breathiness exercises improved from the pitch exercises and sounded more natural than previous sessions. Ms. Calvillo identified when she forgot or lost her breathy vocal quality and self-corrected.    Verbal Communication: Ms. Calvillo demonstrated continued progress with elongating vowels, using rising intonation, and incorporating short bursts/pauses while reading a passage. Ms. Calvillo reported the verbal communication exercises remain challenging to practice independently without clinician support through modeling and scaffolding.     Nonverbal  Communication: Due to time constraints, Ms. Calvillo did not practice nonverbal communication exercises during the session. Ms. Calvillo smiled often during exercises, especially when receiving positive feedback. She reported nonverbal aspects of communication remain challenging to maintain independently without clinician support through modeling and scaffolding.     Homework: Ms. Calvillo was instructed to complete exercises provided in the Session 7 Homework handout. She was also instructed to video and audio record herself during practice to increase her personal perception of changing vocal aspects. Ms. Calvillo stated she will complete the exercises in the Session 7 homework handout, and she will ask her partner for specific feedback.     Plan  Next session will start with the nonverbal communication exercises from Session 7. Afterwards, Ms. Calvillo will complete the activities from Session 8 of the Transgender Voice and Communication program.     Attestation:  I was present and directly supervised the treatment, including active involvement in the patient’s care, and directing the student clinician in the service. I agree with the assessment and treatment plan of care provided.

## 2021-10-14 ENCOUNTER — SPEECH THERAPY (OUTPATIENT)
Dept: SPEECH THERAPY | Facility: OTHER | Age: 26
End: 2021-10-14
Payer: COMMERCIAL

## 2021-10-15 NOTE — PATIENT COMMUNICATION
Ms. Calvillo called to cancel her voice therapy session today due to a sore throat. She called to cancel around 1:45pm.

## 2021-10-18 ENCOUNTER — TELEPHONE (OUTPATIENT)
Dept: SPEECH THERAPY | Facility: OTHER | Age: 26
End: 2021-10-18

## 2021-10-28 ENCOUNTER — SPEECH THERAPY (OUTPATIENT)
Dept: SPEECH THERAPY | Facility: OTHER | Age: 26
End: 2021-10-28
Payer: COMMERCIAL

## 2021-10-28 DIAGNOSIS — R49.9 VOICE AND RESONANCE DISORDER: ICD-10-CM

## 2021-10-28 PROCEDURE — 92507 TX SP LANG VOICE COMM INDIV: CPT | Mod: GT,GC

## 2021-10-29 NOTE — OP THERAPY DAILY TREATMENT
"Subjective    Due to the ongoing covid-19 public health emergency, speech teletherapy services were rendered via a HIPAA-compliant Zoom platform with no less than 25% direct supervision and 100% availability by a licensed and certified speech-language pathologist. The therapy session began at 3:15pm due to a scheduling error with technical difficulties regarding Ms. Calvillo’s virtual visit and Zoom meeting link. Ms. Calvillo demonstrated patience and a calm demeanor while waiting for her session to begin. She smiled often during the session and looked happy about her progress since the summer treatment period. After two weeks between sessions and recovering from illness, Ms. Calvillo seemed comfortable independently practicing each exercise.     Objective     The following activities were completed from Session 7 of the Transgender Voice and Communication treatment program:     Review homework: Due to illness, Ms. Calvillo was unable to complete the homework exercises.     Vocal Relaxation: Ms. Calvillo independently completed two vocal relaxation techniques: yawn-sigh and tongue protrusion with phonation of the /i/ phoneme without clinician assistance. Ms. Calvillo completed the laryngeal massage vocal relaxation technique with minimal clinician support (I.e., counter-clockwise hand motion).     Breath support: Ms. Calvillo independently completed two breath support activities: yoga breathing and snuff-hiss without clinician modeling.    Pitch Modification: Ms. Calvillo's target pitch increased from 196 Hz (G3) to 207 Hz (A3) since the previous session to serve as a guideline for all activities. Ms. Calvillo completed the following pitch modification exercises with minimal clinician models: sing notes 1-2-3-4-5 in ascending order, rest, then sing notes 5-4-3-2-1 in descending order,  sing \"Meet me, Peter, meet me\" and \"Pay me, Peter, pay me\" in ascending then descending order, say novel words and " sentences at the target pitch (207 Hz) using natural intonation, and read a passage while maintaining her target pitch longer than single sentences.     Resonance: Ms. Calvillo completed the following resonance exercises with minimal to moderate clinician support through modeling and scaffolding: chant isolated phoneme on single pitch, chant vowel consonant and vowel consonant combinations on a single pitch, say phrases and sentences with natural intonation, and read a passage while maintaining oral-forward resonance longer than single sentences.     Breathiness: Ms. Calvillo completed the following breathiness exercises with minimal to moderate clinician support through modeling and scaffolding: exhale /h/ and /ha/, then maintain a breathy vocal quality with light contact in phrases, short sentences, and connected text in a reading passage.      Verbal Communication: Ms. Calvillo completed the following verbal communication exercises with minimal to moderate clinician support through modeling and scaffolding: rising intonation at the end of phrases, pauses in short bursts, vowel exaggerations/prolongations, adverbial clauses, and tag questions within structured conversation.     Nonverbal Communication: Ms. Calvillo completed the following nonverbal communication exercises with moderate clinician support through modeling and scaffolding: head movements, hand and arm movement (e.g., fluid with a gentle bend at the elbow and wrist), eye contact, and smiling.     Homework: Ms. Calvillo was instructed to complete exercises provided in the Session 7 Homework handout with particular emphasis to verbal and nonverbal aspects of communication. She was also instructed to video and audio record herself during practice to increase her personal perception of changing vocal aspects as well as practice with her significant other for constructive feedback.    Assessment    Review homework: Ms. Calvillo reportedly did not  "practice over the past two weeks since the previous session due to illness (I.e., sore throat and upset stomach symptoms). Ms. Calvillo stated she completed the homework exercises from the previous session with ease.     Vocal Relaxation: Ms. Calvillo reported ease while independently completing two vocal relaxation techniques, yawn-sigh and tongue protrusion with phonation of the /i/ phoneme, without clinician assistance. Ms. Calvillo expressed minimal confusion independently practicing the laryngeal massage (i.e., counter-clockwise hand motion). Visual and auditory support through clinician modeling helped Ms. Calvillo complete the laryngeal massage.     Breath support: Ms. Calvillo reported ease while independently completing two breath support activities: yoga breathing and snuff-hiss without clinician modeling.    Pitch Modification: Overall, Ms. Calvillo demonstrated good pitch variation and maintained her target pitch throughout various activities Ms. Calvillo reported ease independently completing the following pitch modification exercises with minimal clinician models: match target pitch, sing notes 1-2-3-4-5, rest, then 5-4-3-2-1, sing \"Meet me, Peter, meet me\" and \"Pay me, Peter, pay me\" in ascending then descending order, and say novel words and sentences at the target pitch using natural intonation. Ms. Calvillo reported minor difficulty maintaining her target pitch for multiple complex sentences in a reading passage consisting of two paragraphs.     Resonance: Ms. Calvillo reported the following resonance exercises remain challenging to practice without clinician support of modeling and scaffolding: say phrases and sentences with natural intonation and read a passage while maintaining oral-forward resonance for numerous consecutive sentences. Auditory and visual support from the clinician involved identifying parts of sentences when Ms. Calvillo lost her oral-forward resonance while speaking " and reading.     Breathiness: Ms. Calvillo reported the following breathiness exercises remain challenging to practice without clinician support through modeling and scaffolding: exhale /h/ and /ha/ as well as maintain a breathy quality in phrases, short sentences for structured conversation, and connected text within a reading passage. Ms. Calvillo expressed continued difficulty identifying parts of sentences when she lost her light contact breathiness while speaking and reading. Overall, her vocal quality during the breathiness exercises improved from the pitch exercises and continued to sound more natural than previous sessions.     Verbal Communication: Ms. Calvillo reported the following verbal communication exercises remain challenging to practice independently without clinician support through modeling and scaffolding: short bursts/pauses, elongated vowels, rising intonation, adverbial clauses, and tag questions. She demonstrated more ease and comfortability maintaining verbal aspects of communication while speaking and reading than in previous sessions.     Nonverbal Communication: Ms. Calvillo reported hand, arm, and trunk movements in nonverbal communication exercises remain challenging to practice independently without clinician support through modeling and scaffolding. Ms. Calvillo smiled often during exercises, especially when receiving positive feedback, and demonstrated head nodding more than previous sessions.     Homework: Ms. Calvillo was instructed to complete exercises provided in the Session 7 Homework handout. She was also instructed to video and audio record herself during practice to increase her personal perception of changing vocal aspects. Ms. Calvillo stated she will complete the exercises in the Session 7 homework handout as well as practice verbal and nonverbal aspects of communication with her significant other for additional perceptual feedback.    Plan    Next session, Ms.  Karli will complete the activities from Session 8 of the Transgender Voice and Communication program with particular emphasis to resonance, breathiness, and nonverbal aspects of communication.     Attestation:  I was present and directly supervised the treatment, including active involvement in the patient’s care, and directing the student clinician in the service. I agree with the assessment and treatment plan of care provided.

## 2021-11-04 ENCOUNTER — TELEMEDICINE (OUTPATIENT)
Dept: SPEECH THERAPY | Facility: OTHER | Age: 26
End: 2021-11-04
Payer: COMMERCIAL

## 2021-11-04 DIAGNOSIS — R49.9 VOICE AND RESONANCE DISORDER: ICD-10-CM

## 2021-11-04 PROCEDURE — 92507 TX SP LANG VOICE COMM INDIV: CPT | Mod: GT,GC,GN | Performed by: SPEECH-LANGUAGE PATHOLOGIST

## 2021-11-05 NOTE — OP THERAPY DAILY TREATMENT
Subjective    Due to the ongoing Covid-19 public health emergency, speech teletherapy services were rendered via a HIPAA-compliant Zoom platform with no less than 25% direct supervision, and 100% availability by a licensed and certified speech-language pathologist.     Ms. Calvillo logged on promptly to the Zoom session at 3pm. Ms. Calvillo had a jovial demeanor as demonstrated by smiling and laughing often during therapy activities. She accepted feedback graciously, and Ms. Calvillo seemed happy about her progress throughout the treatment period.     Objective   The following activities were completed from Session 8 of the Transgender Voice and Communication treatment program:     Review homework: Ms. Calvillo completed the exercises from the Session 7 handout four out of seven days this week. Due to lack of a printer at home, Ms. Calvillo has not been able to fill out the homework chart to track progress. She reportedly reads the homework chart and exercises within the power point slides on her computer and phone to practice.     Vocal Relaxation: Ms. Calvillo independently completed three vocal relaxation techniques: laryngeal massage, yawn-sigh, and tongue protrusion with phonation of the /i/ phoneme without clinician support through modeling.     Breath support: Ms. Calvillo independently completed two breath support activities: yoga breathing and snuff-hiss without clinician support through modeling.     Spontaneous Speech Activities: Ms. Calvillo completed the following spontaneous speech activities while practicing all aspects of voice (pitch, resonance, breathiness, verbal communication, and nonverbal communication): reading a passage, playing a barrier and taboo game, and story starters with moderate clinician support through modeling and scaffolding her responses.     Homework: Ms. Calvillo will complete the homework handout from session 8 while recording herself during practice. Also, Ms.  Karli was encouraged to participate in conversation with both familiar and unfamiliar listeners this week.      Assessment    Review Homework: Ms. Calvillo independently completed the exercises from the previous session. She reported the homework was clear and felt easier to complete since the first session. Ms. Calvillo also reported feeling symptoms of a sore throat (i.e., scratchy feeling and soreness) this morning while practicing.     Vocal Relaxation and Breath Support: Ms. Calvillo completed these exercises independently and with ease. She expressed an increased comfortability with the exercises since the first session. Ms. Calvillo did not require spoken clinician support during these activities. Ms. Calvillo received minimal visual support by the clinician counting five seconds each for her inhalations, holding the air, and exhalations.     Spontaneous Speech Activities: Ms. Calvillo required moderate support to maintain an oral-forward resonance and light contact breathiness throughout the reading passage and spontaneous conversation. The clinician used audio-recordings to provide specific feedback to Ms. Calvillo during an echo read. Once provided with clinician models and some scaffolding, Ms. Calvillo demonstrated increased awareness of her voice and produced a more oral-forward resonance with breathiness. Ms. Calvillo exhibited more control over the various aspects of voice during structured tasks, such as reading a passage, in comparison to spontaneous tasks (e.g. natural conversation or the [employer] game). Overall, Ms. Calvillo spoke around her target pitch (A3) consistently and displayed an appropriate range of breathiness across tasks. Ms. Calvillo exhibited good use of verbal communication skills, such as using rising intonation, smiling, and speaking in short bursts. She also exhibited good use of nonverbal communication skills, such appropriate eye contact.     Homework: Ms.  Karli stated that she will complete the homework handout from Session 8 Week 1. She will track her vocal hygiene (e.g. water intake), complete a vocal warm up, and record two spontaneous speech samples. During practice, Ms. Calvillo was encouraged to briefly converse with both familiar and unfamiliar listeners using her ideal speaking voice to receive additional feedback about various aspects of her voice.           Plan   The next session will collect final progress data to compare Ms. Torress vocal progress from the beginning of the treatment period to the current session, including speaking fundamental frequency, breathiness, resonance, verbal communication, and nonverbal communication tasks. Additionally, the clinician will collect her self-assessment and self-reflection measures regarding Ms. Calvillo's spontaneous practice conversations with familiar and unfamiliar listeners.     Attestation:  I was present and directly supervised the treatment, including active involvement in the patient’s care, and directing the student clinician in the service. I agree with the assessment and treatment plan of care provided.

## 2021-11-17 ENCOUNTER — TELEMEDICINE (OUTPATIENT)
Dept: SPEECH THERAPY | Facility: OTHER | Age: 26
End: 2021-11-17
Payer: COMMERCIAL

## 2021-11-17 DIAGNOSIS — R49.9 VOICE AND RESONANCE DISORDER: ICD-10-CM

## 2021-11-17 PROCEDURE — 92507 TX SP LANG VOICE COMM INDIV: CPT | Mod: GT,GC,GN

## 2021-11-22 ENCOUNTER — TELEMEDICINE (OUTPATIENT)
Dept: SPEECH THERAPY | Facility: OTHER | Age: 26
End: 2021-11-22
Payer: COMMERCIAL

## 2021-11-22 DIAGNOSIS — R49.9 VOICE AND RESONANCE DISORDER: ICD-10-CM

## 2021-11-22 PROCEDURE — 92507 TX SP LANG VOICE COMM INDIV: CPT | Mod: GT,GC,GN | Performed by: SPEECH-LANGUAGE PATHOLOGIST

## 2021-11-23 NOTE — OP THERAPY DAILY TREATMENT
Subjective   Due to the ongoing Covid-19 public health emergency, speech teletherapy services were rendered via a HIPAA-compliant Zoom platform with no less than 25% direct supervision, and 100% availability by a licensed and certified speech-language pathologist.       Therapy services were provided on Monday instead of Thursday to recognize the Thanksgiving holiday this week. Due to technical difficulties related to her audio connection for the Zoom link, Ms. Calvillo successfully logged on for the therapy session at 12:15pm. Ms. Calvillo remained motivated to participate in therapy activities throughout the session, and she accepted feedback graciously. She created personal goals for vocal aspects she would like to improve over winter break and the Spring 2022 treatment period.       Objective   The following activities were completed from Sessions 3 and 5 of the Transgender Voice and Communication treatment program:       Review homework: Ms. Calvillo completed the exercises from the Session 8 homework handout. Ms. Calvillo demonstrated her routine for practicing the homework by sharing her screen with the clinician to read and complete the practice exercises from the power point slides on her computer. She did not report any new vocal hygiene habits or concerns.       Vocal Relaxation: Ms. Calvillo independently completed three vocal relaxation techniques: laryngeal massage, yawn-sigh, and tongue protrusion with phonation of the /i/ phoneme without clinician support through modeling.       Breath Support: Ms. Calvillo independently completed two breath support techniques, yoga breathing and snuff-hiss, without clinician support through modeling.     Resonance: Ms. Calvillo independently completed three resonance exercises without clinician support through modeling (I.e., neck stretches, lip trills without voicing, and lip trills with downward pitch voicing). She completed the resonance exercises from  "Session 5 with natural intonation: chant /bianka/, say \"meet me,\" \"meet me, Peter, meet me\", and \"meet me, Maverick, meet me,\" with clinician support through modeling and scaffolding.     Breathiness: Ms. Calvillo completed the breathiness exercises from Session 3 with clinician support through modeling and scaffolding: say /h/, /ha/, \"Sharla\", \"Sharla has a hat,\" \"Who are you?, and \"How is Sharla now that Maverick has gone?\". After reading the passage \"[place]\" from Session 5, the clinician instructed Ms. Calvillo to practice light-contact breathiness for the /l/ phoneme in isolation, CV pairs (e.g., \"lalala\"), within words (e.g., \"Liv\") and phrases (e.g., \"Liv has a hat\") with moderate clinician support through modeling and scaffolding.     Spontaneous Speech Activities: Ms. Calvillo completed spontaneous speech activities while practicing all aspects of voice (e.g., pitch, resonance, breathiness, verbal communication, and nonverbal communication) with clinician support through modeling and scaffolding. She read the \"[place]\" passage from Session 5 and produced spontaneous adverbial clauses based on the verbal aspects of communication exercises from Session 3 with clinician support.     Homework: Ms. Calvillo will complete the \"Week 3: Independent Practice\" homework handout from Session 8 of the Transgender Voice and Communication treatment program while recording herself during practice. Also, Ms. Calvillo was encouraged to participate in structured and spontaneous conversations with both familiar and unfamiliar listeners this week.       Assessment   Review Homework: Ms. Calvillo independently completed the homework exercises from the previous session. She reported the homework continues to feel easier to complete than the first session, and she created new homework goals to practice over winter break. Ms. Calvillo also reported continued use of the [employer] dulce on her phone to help her initially produce " "her target pitch.       Vocal Relaxation, Breath Support, and Resonance Warm-Up Activities: Ms. Calvillo completed these exercises independently with ease, and she did not require spoken clinician support or modeling during these activities. Ms. Calvillo received minimal visual support for the yoga breathing exercise as demonstrated by the clinician counting up to five seconds on her fingers for each of Ms. Calvillo's inhalations, holding air, and exhalations. She discussed personal goals related to vocal relaxation and breath support (e.g., breathing from the diaphragm) with clinician feedback and scaffolding.     Resonance Exercises from Session 5: Ms. Calvillo completed these exercises (I.e., chant /bianka/, say \"meet me,\" \"meet me, Peter, meet me\", and \"meet me, Maverick, meet me,\") with moderate clinician support through modeling and scaffolding. Ms. Calvillo reportedly felt more comfortable demonstrating these exercises than previous sessions, and she demonstrated self-awareness about moments when she did not perceive oral-forward resonance in her voice. Ms. Calvillo aims to improve her ability to spontaneously produce oral-forward resonance within the first few spoken words of her responses while maintaining her target pitch.       Breathiness Exercises from Sessions 3 & 5: Ms. Calvillo completed the breathiness exercises from Session 3 with clinician support through modeling and scaffolding: say /h/, /ha/, \"Sharla\", \"Sharla has a hat,\" \"Who are you?, and \"How is Sharla now that Maverick has gone?\". Ms. Calvillo reportedly felt more comfortable demonstrating these exercises than previous sessions, and she demonstrated self-awareness during moments when she did not perceive light-contact breathiness in her voice. After reading the passage \"[place]\" from Session 5, the clinician instructed Ms. Calvillo to practice light-contact breathiness for the /l/ phoneme in isolation, CV pairs (e.g., \"lalala\"), " "within words (e.g., \"Liv\") and phrases (e.g., \"Liv has a hat\") with moderate clinician support through modeling and scaffolding. She and the clinician perceived differences in her vocal quality when she read the word \"[place]\" at the beginning of the reading passage in contrast to reading the same word later in the passage. Ms. Calvillo reportedly aims to improve her ability to spontaneously produce light contact breathiness within the first few spoken words of her responses while maintaining her target pitch.     Spontaneous Speech Activities from Sessions 3 & 5: Ms. Calvillo required moderate support through clinician modeling and scaffolding to maintain her target pitch, oral-forward resonance, light contact breathiness, verbal aspects of communication, and nonverbal aspects of communication during spontaneous speech activities (I.e., reading the \"[place]\" passage and producing spontaneous adverbial clauses). The clinician used audio-recordings throughout the session to provide specific auditory biofeedback for Ms. Calvillo. With clinician support through modeling, scaffolding, and audio recordings, Ms. Calvillo demonstrated increased awareness of her voice by pointing out vocal aspects she would like to improve (I.e., oral-forward resonance with light contact breathiness). Overall, Ms. Calvillo produced her target pitch (A3) consistently at the sentence level while reading a passage and participating in spontaneous conversation. Ms. Calvillo reportedly aims to decrease her use of the piano dulce on her phone as auditory support to achieve her target pitch, and she will practice initiating her target pitch spontaneously to respond within spoken conversations. With clinician support through modeling and scaffolding, Ms. Calvillo displayed an appropriate range of oral-forward resonance and light-contact breathiness while reading the passage and producing adverbial clauses. Ms. Calvillo exhibited more " "control over the various aspects of voice during structured tasks (e.g., reading a passage) in comparison to spontaneous tasks (e.g. spontaneous conversations with adverbial clauses). Ms. Calivllo exhibited good use of verbal communication skills, such as rising intonation and speaking in short bursts (e.g., pausing every four to six words). In regard to homework goals over winter break, Ms. Calvillo aims to improve her vowel prolongations for emotional emphasis while using more descriptive words (e.g., adjectives and adverbial clauses). Ms. Calvillo continued to naturally demonstrate nonverbal aspects of communication, such as smiling, appropriate eye contact, and head nodding. Her nonverbal aspects of communication goal over winter break will prioritize arm movements originating from the elbows for fluid gestures.        Homework: Ms. Calvillo stated that she will complete the \"Week 3: Independent Practice\" homework handout from Session 8 of the Transgender Voice and Communication treatment program. Also, she will track her vocal hygiene (e.g. water intake), complete vocal warm ups, and record both structured and spontaneous speech samples. During practice, Ms. Calvillo was encouraged to briefly converse with both familiar and unfamiliar listeners using her ideal speaking voice to receive additional feedback about various aspects of her voice.       Plan   The next session will conclude the Fall 2021 treatment period with comparisons of Ms. Calvillo's vocal progress from Sessions 1 through 8 of the Transgender Voice and Communication treatment program related to speaking fundamental frequency, breathiness, resonance, verbal communication, and nonverbal communication skills. Additionally, the clinician will review Ms. Calvillo's perceived progress and potential concerns throughout the program for both structured and spontaneous speech activities with familiar and unfamiliar listeners.     Attestation:  I was " present and directly supervised the treatment, including active involvement in the patient’s care, and directing the student clinician in the service. I agree with the assessment and treatment plan of care provided.

## 2021-12-02 ENCOUNTER — TELEMEDICINE (OUTPATIENT)
Dept: SPEECH THERAPY | Facility: OTHER | Age: 26
End: 2021-12-02
Payer: COMMERCIAL

## 2021-12-02 DIAGNOSIS — R49.9 VOICE AND RESONANCE DISORDER: ICD-10-CM

## 2021-12-02 PROCEDURE — 92507 TX SP LANG VOICE COMM INDIV: CPT | Mod: GT,GC | Performed by: SPEECH-LANGUAGE PATHOLOGIST

## 2021-12-13 NOTE — OP THERAPY PROGRESS SUMMARY
Background Information  The Fall 2021 treatment period concluded Ms. Nato Calvillo’s second treatment period of the [facility] How to Teach Voice and Communication to Transgender Women program through the [facility] within the [facility]. Due to the ongoing Covid-19 public health emergency and Ms. Calvillo’s lack of transportation, speech therapy services for the Fall 2021 treatment period were rendered via a HIPAA-compliant Zoom platform with no less than 25% direct supervision, and 100% availability by a licensed and certified speech-language pathologist.     Ms. Calvillo attended 9/10 sessions, which equates to a 90% rate.  She missed one session due to illness.  Ms. Calvillo’s goals for the treatment period consisted of maintaining her speaking fundamental frequency, producing an oral-forward resonance, producing light-contact breathiness, using more feminine verbal aspects of communication, and using more nonverbal aspects of communication.     Within the Summer 2021 treatment period, Ms. Calvillo completed the activities from Pre-Session 1, Pre-Session 2, and Sessions 1 through 5 in the Here’[facility] How to Teach Voice and Communication to Transgender Women program. She was introduced to different exercises to practice various aspects of feminine voice and communication while targeting Ms. Calvillo’s ideal target voice. During the Fall 2021 treatment period, Ms. Calvillo completed activities from Sessions 6, 7, and 8 in the [facility] How to Teach Voice and Communication to Transgender Women program to improve endurance for maintaining her updated target voice at fundamental frequencies between 207 Hz to 220 Hz (G#3 to A3) in words and sentences. Also, the exercises within the Fall 2021 treatment period provided opportunities for Ms. Calvillo to experiment with various aspects of feminine voice and communication using client reports and clinical  observations to provide qualitative progress data.     Summary of Progress for the Fall 2021 Treatment Period   Long-term Goal # 1:  Ms. Calvillo will achieve a more feminine, gender-congruent voice in a safe and efficient manner.  Short-term Goal # 1:  Ms. Calvillo will increase her speaking fundamental frequency to 220 Hz (A3) at the word level in 4/5 opportunities with minimal clinician support of cues by the end of the treatment period.  Status: Met  Baseline Measures: Ms. Calvillo’s initial assessment on March 10, 2021 at the [facility] documented her comfortable speaking fundamental frequency at 177 Hz (between the notes F3 and F#3). In July 2021, Ms. Calvillo increased her speaking fundamental frequency to 187 Hz (between the notes F#3 and G3) at the word level in 4/5 opportunities with minimal clinician support of cues. The Transgender Voice and Communication program recommended that she only increases her target pitch by 10 Hz each session. During the Mid-Program Check in, Ms. Calvillo expressed interest in practicing pitch exercises more than one octave above the calculated and recommended target pitch of 196 Hz noted as G3 on the piano graph handout (i.e., she wanted to practice at 415 Hz noted as G#4). Even though Ms. Calvillo demonstrated a vocal range from 177 Hz (F3) to 440 Hz (A4) at the single word level, she demonstrated difficulty maintaining a target voice of 415 Hz at the phrase level. The clinician advised Ms. Calvillo to prioritize endurance at a lower target pitch (i.e., 196 Hz) to decrease vocal fatigue, vocal strain, and pitch breaks during longer activities. At the beginning of the Fall 2021 treatment period, Ms. Calvillo agreed to practice vocal endurance at a target pitch between 207 Hz (G#3) and 220 Hz (A3) during spontaneous speech activities at the word and sentence level.    Approaches Used: Ms. Calvillo completed a hierarchy of pitch tasks in order to  safely increase and maintain her target pitch. Each week, she was given a five-note scale, with the third note being her target pitch. She used a piano application to ensure she was matching and maintaining her target pitch at all times. The following activities were used from the “Transgender Voice and Communication” treatment program: match target pitch, match lower and higher pitch, hum notes in ascending and descending order, hum notes in varying order, sing numbers in ascending and descending order, sing numbers in varying order, sing short phrases in ascending and descending order, say novel words and phrases at target pitch, and engage in structural conversation at target pitch. Ms. Calvillo relied on a piano dulce to accurately match her new target pitch between 207 Hz (G#3) and 220 Hz (A3) for the pitch exercises emphasizing endurance. The clinician used audio-recordings throughout the session to provide specific auditory biofeedback for Ms. Calvillo. Independent exercises were provided weekly to supplement the skills practiced during therapy. It is likely Ms. Calvillo met this goal because of the strategies used above, repetition, and independent practice outside of therapy sessions.     Final Progress December 2021: Throughout the Fall 2021 treatment period, Ms. Calvillo demonstrated ease producing her target pitch between 207 Hz (G#3) and 220 Hz (A3) consistently at the word level without clinician support through modeling. In November 2021, Ms. Calvillo independently produced her target pitch of 220 Hz (A3) at the sentence level while reading a passage and participating in spontaneous conversation without clinician support through modeling. Ms. Calvillo reportedly aims to decrease her use of the piano dulce on her phone as auditory support to achieve her target pitch, and she will practice initiating her target pitch spontaneously to respond within spoken conversations.    Short-term Goal #2:  Ms.  Karli will continue vocal relaxation and breath support exercises as needed to prepare the vocal mechanism for modifications with minimal clinician support of cues by the end of the treatment period.  Status: Met  Baseline Measures: Subjective data from a SOAP note dated 7/1/2021 revealed tactile sensitivity during vocal relaxation as reported by feelings of discomfort and anxiety during laryngeal massages. In July 2021 and September 2021, Ms. Calvillo completed three vocal relaxation techniques (i.e., laryngeal massage, yawn-sigh, and tongue protrusion) and two breath support activities (i.e., yoga breathing and snuff-hiss) with minimum assistance of clinician modeling.      Approaches/Strategies Used: Each week Ms. Calvillo completed vocal warm-up exercises from the “Transgender Voice and Communication” treatment program to prepare her vocal mechanism for the rest of the session. Vocal relaxation exercises consisted of laryngeal massage, yawn-sigh, and tongue protrusion with phonation of the /i/ phoneme. Breath support exercises consisted of yoga breathing and the snuff-hiss exercise to promote independent breathing from the diaphragm during speech. These exercises required limited assistance as the treatment period progressed. Independent exercises were provided weekly as homework to supplement the skills she was working on during therapy. It is likely Ms. Calvillo met this goal because of the strategies used above, repetition, and independent practice outside of therapy sessions.     Final Progress December 2021: Ms. Calvillo completed three vocal relaxation techniques: laryngeal massage, yawn-sigh, and tongue protrusion with phonation of the /i/ phoneme independently with ease, and she did not require spoken clinician support or modeling during these activities. She reported feeling less tactile sensitivity and anxiety during the laryngeal massage exercise due to increased comfort from practicing each  "week. Also, Ms. Calvillo independently completed two breath support techniques, yoga breathing and snuff-hiss, without clinician support through modeling. Ms. Calvillo reported independent practice outside of therapy.     Short-term Goal #3:  Ms. Calvillo will produce an oral-forward resonance at the word level in 4/5 opportunities with minimal clinician support of cues by the end of the treatment period.  Status: Met  Baseline Measures: Subjective data from a SOAP note dated 6/17/21 revealed Ms. Calvillo produced resonance at the neck during speech. In July 2021 and September 2021, Ms. Calvillo completed one resonance warm-up activity (i.e., head and neck stretches) independently and two resonance warm-up exercises (i.e., lip trills without voicing and lip trills with a downward pitched sigh) with minimal clinician support through modeling. Ms. Calvillo completed the following resonance exercises with moderate clinician assistance of modeling, phonemic cues, and scaffolding (i.e., building upon Ms. Calvillo’s knowledge of resonance and gradually withdrawing auditory support): say the following short sentences with natural intonation: \"Meet me, Peter\" and \"Meet me Peter, meet me\", and answer questions in structured conversation while maintaining oral-forward resonance.    Approaches / Strategies Used:  Ms. Calvillo completed a variety of tasks each week to help create an oral-forward resonance. The following activities from the Transgender Voice and Communication Treatment Program were used: warm-up to release excess tension, hum isolated phoneme on single pitch, chant VC and VCVC on single pitch, chant short sentences on single pitch, say phrases and sentences with natural intonation, and maintain oral-forward resonance with natural intonation while reading paragraphs of connected text aloud, and participating in spontaneous conversations. Independent exercises were provided weekly to supplement the skills " "practiced during therapy. Also, the clinician used audio-recordings throughout the session to provide specific auditory biofeedback for Ms. Calvillo. It is likely Ms. Calvillo met this goal because of the strategies used above, repetition, and independent practice outside of therapy sessions.     Final Progress December 2021: Ms. Calvillo completed the following resonance exercises independently: warm-up activity (head and neck stretches), lip trills without voicing, lip trills with a voiced downward pitched sigh, humming /m/, chanting /bianka/, and chanting “meet me” without clinician support through modeling. Ms. Calvillo consistently produced oral-forward resonance at the single word level involving nasal speech sounds, and said the following sentences with natural intonation and oral-forward resonance: \"Meet me, Peter\" and \"Meet me Peter, meet me\", with minimal clinician support through modeling. Ms. Calvillo continued to receive moderate clinician assistance of modeling, phonemic cues, and scaffolding (i.e., building upon Ms. Calvillo’s knowledge of resonance and gradually withdrawing auditory support) to maintain her oral-forward resonance while reading multiple sentences within a paragraph of connected text aloud, answering questions in structured conversation, and participating in spontaneous conversational topics. Ms. Calvillo reportedly felt more comfortable demonstrating these exercises than previous sessions, and she demonstrated self-awareness about moments when she did not perceive oral-forward resonance in her voice. Ms. Calvillo aims to improve her ability to spontaneously produce oral-forward resonance within the first few spoken words of her responses while maintaining her target pitch.       Short-term Goal #4:  Ms. Calvillo will produce light-contact breathiness at the word level in 4/5 opportunities with minimal clinician support of cues by the end of the treatment period.   Status: " "Met  Baseline Measures: Subjective data from a SOAP note dated 6/17/21 revealed Ms. Calvillo revealed hard contact on her vocal folds during speech. In July and September 2021, Ms. Calvillo produced a moderately light-contact breathiness across various tasks at the word level in structured conversation with moderate clinician assistance of modeling and scaffolding.      Approaches / Strategies Used:  Ms. Calvillo completed a variety of tasks each week to help create light-contact breathiness. The following activities from the Transgender Voice and Communication Treatment Program were used: exhale isolated phoneme and VC combinations, say single word and short sentences, read paragraphs of connected text aloud, engage in structured and spontaneous conversation while maintaining light-contact breathiness. Clinician support involved models for the spectrum of breathiness and was encouraged to experiment with over exaggerating her breathiness in addition to practicing naturalness. Ms. Calvillo exhibited mild difficulty with longer phrases and spontaneous conversations, but she was able to explore the full spectrum of light and hard contact breathiness. The clinician used audio-recordings throughout the session to provide specific auditory biofeedback for Ms. Calvillo. Independent exercises were provided weekly to supplement the skills she was working on during therapy. It is likely Ms. Calvillo met this goal because of the strategies used above, repetition, and independent practice outside of therapy sessions.     Final Progress December 2021: Ms. Calvillo produced a moderately light-contact breathiness in isolated words for the following exercises: say /h/, /ha/, \"Sharla\", “Maverick”, and “Liv” with minimal clinician assistance of modeling. Ms. Calvillo continued to receive moderate clinician assistance of modeling, phonemic cues, and scaffolding (i.e., building upon Ms. Calvillo’s knowledge of breathiness and " "gradually withdrawing auditory support) to maintain naturalness with light-contact breathiness while saying \"Sharla has a hat,\" \"Who are you?, and \"How is Sharla now that Maverick has gone?\" as well as reading multiple sentences within a paragraph of connected text aloud, answering questions in structured conversation, and participating in spontaneous conversational topics. Ms. Calvillo reportedly felt more comfortable demonstrating these exercises than previous sessions, and she demonstrated self-awareness during moments when she did not perceive light-contact breathiness in her voice. After reading the passage \"[place]\" from Session 5, the clinician instructed Ms. Calvillo to practice light-contact breathiness for the /l/ phoneme in isolation, CV pairs (e.g., \"lalala\"), within words (e.g., \"Liv\") and phrases (e.g., \"Liv has a hat\") with moderate clinician support through modeling and scaffolding. She and the clinician perceived differences in her vocal quality when she read the word \"lake\" at the beginning of the reading passage in contrast to reading the same word later in the passage. Ms. Calvillo reportedly aims to improve her ability to spontaneously produce light contact breathiness within the first few spoken words of her responses while maintaining her target pitch.     Short-term Goal #5:  Ms. Calvillo will use more feminine verbal aspects of communication during structured tasks (e.g., rising intonation and phrasing/chunking) in 4/5 opportunities with minimal clinician support of cues by the end of the treatment period.   Status: Met  Baseline Measures: Subjective data from a SOAP note dated 6/17/21 revealed concise language with mostly precise articulation (occasional mumbling) and a perceived monotonous vocal quality. In July 2021 and September 2021, Ms. Calvillo read a passage with moderate rising intonation at the end of phrases, pauses, vowel exaggerations with moderate clinician modeling " and scaffolding in both structured and spontaneous conversation tasks.     Approaches / Strategies Used:  Ms. Calvillo completed various tasks from the Transgender Voice and Communication Treatment Program to improve verbal communication: decreasing speaking rate, extend vowels and use rising intonation during structured reading tasks, adding more adjectives and adverbs in conversation, use of conditional clauses and tag questions during structured tasks, and implementing these skills into conversation. The clinician used audio-recordings throughout the session to provide specific auditory biofeedback for Ms. Calvillo. Independent exercises were provided weekly to supplement the skills she was working on during therapy. It is likely Ms. Calvillo met this goal because of the strategies used above, repetition, and independent practice outside of therapy sessions.     Final Progress December 2021: Ms. Calvillo exhibited good use of verbal communication skills, such as rising intonation and speaking in short bursts (e.g., pausing every four to six words) with minimal clinician support through modeling. Ms. Calvillo exhibited more control over the various aspects of voice during structured tasks (e.g., reading a passage) in comparison to spontaneous tasks (e.g. spontaneous conversations with adverbial clauses). In regard to homework goals over winter break, Ms. Calvillo aims to improve her vowel prolongations for emotional emphasis while using more descriptive words (e.g., adjectives and adverbial clauses).      Short-term Goal #6:  Ms. Calvillo will use more feminine nonverbal aspects of communication in structured tasks (e.g. fluid gestures and animated facial expressions) in 4/5 opportunities with minimal clinician support of cues by the end of the treatment period.  Status: Continue  Baseline Measures:  Subjective data from a SOAP note dated 6/17/2021 revealed Ms. Calvillo’s nonverbal communication consisted  of rigid gestures and moderately animated facial expressions. In July 2021 and September 2021, Ms. Calvillo demonstrated animated facial expressions, nodding, and smiling during 50% to 75% of the nonverbal communication exercises with moderate clinician assistance of modeling and scaffolding.      Approaches / Strategies Used:  Ms. Calvillo completed the following tasks from the Transgender Voice and Communication Treatment Program to improve her nonverbal communication skills: practice head nods and leaning in, mirroring head movements during conversation, fluid arm movements in isolation (e.g. arm extensions and gesturing), smiling exercises, appropriate use of eye contact, and practicing gait/walk/stride. Independent exercises were provided weekly to supplement the skills she was working on during therapy. Ms. Calvillo was encouraged to video record herself practicing nonverbal aspects of communication to provide specific visual biofeedback. Due to therapy sessions via Zoom with a limited camera view, Ms. Calvillo had limited mobility during activities to demonstrate fluid arm movements and practicing gait/walk/stride. Future clinicians could prioritize this short-term goal by instructing Ms. Calvillo to video record herself demonstrating nonverbal aspects of communication as part of her homework exercises.      Final Progress December 2021: Ms. Calvillo naturally demonstrated nonverbal aspects of communication involving her head and face, such as smiling, appropriate eye contact, and nodding her head during structured and spontaneous conversation with minimal clinician support through modeling. She demonstrated significant improvement using more feminine nonverbal aspects of communication (i.e., animated facial expressions) since the beginning of the treatment period. Due to therapy sessions via Zoom with limited camera view and limited mobility, Ms. Calvillo had difficulty demonstrating fluid arm  movements and practicing gait/walk/stride. Her nonverbal aspects of communication goal over winter break will prioritize arm movements originating from the elbows for fluid gestures and practicing a more feminine gait/walk/stride.      Short-term Goal #7:  Clinician will monitor Ms. Calvillo’s vocal quality and vocal hygiene in terms of water intake and vocal habits.  Status: Continue  Baseline Measures: Subjective data from a SOAP note dated 6/17/2021 revealed Ms. Calvillo drinks about one water bottle and one PowerAde (16 fl. Oz.) daily. In September 2021, Ms. Calvillo reported that she still drinks one water bottle and one PowerAde (16 fl. Oz.) daily. She denied a history of smoking, vaping, work related vocal strain, and excessive throat clearing/coughing.      Approaches / Strategies Used:  Along with the weekly independent exercises, Ms. Calvillo tracked her vocal hygiene each week. This consisted of noting how many ounces of water she drank per day and if she coughed or cleared her throat excessively. She was also provided with a vocal hygiene handout that discussed the ways to keep the vocal mechanism healthy. Each week during the homework review, Ms. Calvillo reportedly did not increase her daily water intake from her baseline measures (i.e., 16 ounces of daily fluids through water and Powerade). Clinician support involved spoken encouragement, visual aides of pictures, and spoken discussions about why increasing her daily intake will improve her vocal endurance.      Progress / Final Data December 2021: Ms. Calvillo reported that she still drinks one water bottle and one PowerAde (16 fl. Oz.) daily. She continued to deny a history of smoking, vaping, work related vocal strain, and excessive throat clearing/coughing. One session during the Fall 2021 treatment period (10/14/2021) was canceled by Ms. Calvillo due to reported congestion and a sore throat. She also reported having a mildly sore throat  during the session on 2021. Ms. Calvillo admitted a need to drink water more frequently to maintain her ideal voice and practice vocal endurance for various aspects of her voice while reading a paragraph of connected text aloud and participating in spontaneous conversation.      Summary of Findings  Ms. Calvillo met five out of seven objectives:   She met the followin. Ms. Calvillo will increase her speaking fundamental frequency to 220 Hz (A3) at the word level in 4/5 opportunities with minimal clinician support of cues by the end of the treatment period.  2. Ms. Calvillo will continue vocal relaxation and breath support exercises as needed to prepare the vocal mechanism for modifications with minimal clinician support of cues by the end of the treatment period.  3. Ms. Calvillo will produce an oral-forward resonance at the word level in 4/5 opportunities with minimal clinician support of cues by the end of the treatment period.  4. Ms. Calvillo will produce light-contact breathiness at the word level in 4/5 opportunities with minimal clinician support of cues by the end of the treatment period.   5. Ms. Calvillo will use more feminine verbal aspects of communication during structured tasks (e.g., rising intonation and phrasing/chunking) in 4/5 opportunities with minimal clinician support of cues by the end of the treatment period.     She did not meet the following objectives:  6. Ms. Calvillo will use more feminine nonverbal aspects of communication in structured tasks (e.g. fluid gestures and animated facial expressions) in 4/5 opportunities with minimal clinician support of cues by the end of the treatment period.  7. 7. Clinician will monitor Ms. Calvillo’s vocal quality and vocal hygiene in terms of water   intake and vocal habits with completion of weekly homework.      Speech therapy services continue to be medically necessary at this time. The initial evaluation documented a  significant need for skilled intervention. It can be expected that measureable progress will continue to be documented in a reasonable amount of time. Services will be provided safely and effectively in an appropriate environment by a licensed and certified speech-language pathologist. It is not anticipated that voice and communication will self-correct without skilled intervention.     Recommendations  A. It is recommended that Ms. Calvillo continues to receive voice modification and communication therapy. Ms. Calvillo expressed continued interest in voice modification therapy to achieve a sustainable feminine-sounding voice.  B.  It is recommended that Ms. Calvillo attend voice and communication therapy one to two times per week for 60-minute sessions at the [facility] either via Zoom or in person. Ms. Calvillo stated a preference for Zoom therapy for the Spring 2022 treatment period due to lack of transportation (i.e., she does not own a car) to travel to the [facility].  C.  Ms. Calvillo completed all eight sessions within the [facility] to Transgender Women program.  Treatment should focus on continuing to build Ms. Calvillo’s endurance while using various aspects of her ideal voice spontaneously at the sentence level.  For the Spring 2022 treatment period, it is recommended that the following long-term and short-term goals will be discussed and prioritized with the client.      Long-Term Goal :  Ms. Calvillo will achieve a more feminine, gender-congruent voice in a safe and efficient manner.     Rationale: The [facility] to Transgender Women program collected information about Ms. Calvillo’s ideal voice in comparison to her “character” voice. Additionally, the nine completed sessions within the Fall 2021 treatment period continued to introduce different exercises for Ms. Calvillo to practice her  endurance for maintaining her updated target voice at fundamental frequencies between 207 Hz to 220 Hz (G#3 to A3) in words and sentences. Also, the exercises within the Fall 2021 treatment period provided opportunities for Ms. Calvillo to practice naturalness for various aspects of feminine voice and communication with client reports and clinical observations to provide qualitative progress data.      Short-Term Goal #1: Ms. Calvillo will maintain her speaking fundamental frequency of 220 Hz (A3) at the sentence level for at least three consecutive sentences in 4/5 opportunities with minimal clinician support of cues by the end of the treatment period.     Short-Term Goal #2: Ms. Calvillo will continue to independently demonstrate vocal relaxation and breath support exercises as needed to prepare the vocal mechanism for modifications with minimal auditory prompts by the end of the treatment period.     Short-Term Goal #3:  Ms. Calvillo will naturally produce an oral-forward resonance at the sentence level in 4/5 opportunities with minimal clinician support of cues by the end of the treatment period.     Short-Term Goal #4:  Ms. Calvillo will naturally produce light-contact breathiness at the sentence level in 4/5 opportunities with minimal clinician support of cues by the end of the treatment period.      Short-Term Goal #5:  Ms. Calvillo will use more feminine verbal aspects of communication, such as vowel prolongations for emotional emphasis while using more descriptive words (e.g., adjectives and adverbial clauses) during both structured and spontaneous tasks in 4/5 opportunities with minimal clinician support of cues by the end of the treatment period.      Short-Term Goal #6:  Ms. Calvillo will use more feminine nonverbal aspects of communication in structured tasks (e.g. fluid gestures originating from the elbows, fluid gait/walk/stride, head nodding, and animated facial expressions) in 4/5  opportunities with minimal clinician support of cues by the end of the treatment period.     Short-Term Goal #7:  Clinician will monitor Ms. Calvillo’s vocal quality and vocal hygiene in terms of water intake (i.e., 64 ounces) daily and vocal habits with completion of weekly homework.      Frequency and Anticipated Duration of Therapy  1 to 2 times per week for 60 minute sessions   12 weeks for Spring 2022 treatment period

## 2022-01-27 ENCOUNTER — TELEPHONE (OUTPATIENT)
Dept: SPEECH THERAPY | Facility: OTHER | Age: 27
End: 2022-01-27
Payer: COMMERCIAL

## 2022-02-02 ENCOUNTER — TELEMEDICINE (OUTPATIENT)
Dept: SPEECH THERAPY | Facility: OTHER | Age: 27
End: 2022-02-02
Payer: COMMERCIAL

## 2022-02-02 DIAGNOSIS — R49.8 OTHER VOICE AND RESONANCE DISORDERS: ICD-10-CM

## 2022-02-02 PROCEDURE — 92507 TX SP LANG VOICE COMM INDIV: CPT | Mod: GT,GC | Performed by: SPEECH-LANGUAGE PATHOLOGIST

## 2022-02-03 NOTE — OP THERAPY DAILY TREATMENT
Subjective    This treatment session was conducted via [employer], a secure and encrypted videoconferencing technology. The patient was in their home in the state [facility].  The patient's identity was confirmed and verbal consent was obtained for this episode of virtual visits. This session was supervised by a licensed and certified speech-language pathologist, who was available for 100% of the session.    Ms. Calvillo arrive five minutes late to the Zoom therapy session due to technical difficulties with her audio setup. Overall, Ms. Calvillo was positive, open to reflective conversation, and engaged in the activities.     This was the first session of the Spring 2022 Treatment Period.    Objective  The purpose of this session was to collect baseline data to determine appropriate therapy targest for the Spring 2022 Treatment Period.    STG1:  Vocal Hygiene  Ms. Calvillo currently drinks 3-4 bottles of liquid a day (3 bottles water, 1 bottle Powerade). She drinks the most liquid in the mornings and evenings, but forgets to drink water midday. Strategies to increase her water intake included eating spicy things. Her biggest barrier to drinking more water is due to her current prescribed medication, Spironolactone, which has increased her urination frequency and decreased her motivation to drink large quantities of water.     STG2: Vocal Relaxation  Ms. Calvillo produced all three vocal relaxation techniques (I.e., laryngeal massage, yawn-sigh, tongue protrusion). She regularly used the laryngeal massage and yawn-sigh exercises outside of therapy, but did not perform the tongue protrusion outside of therapy.    STG3: Breath Support  Ms. Calvillo performed yoga breath and the snuff-hiss independently. She used both breath support exercises outside of therapy.    STG4: Pitch  The Voice Tools application and Piano application were used to measure pitch, but both applications reported lower pitch recordings than what Ms.  Karli was verbally and audibly producing. There were multiple technical difficulties with the voice applications, which produced unreliable data. Baseline data collection was not completed due to time restrictions. Ms. Calvillo offered to complete the baseline data as homework before the following session.     Ms. Calvillo produced the following words at the average pitches:  Apple: 180.9 Hz  Banana: 170.9 Hz  Carrot: 167.8 Hz  Donut: 182.6 Hz  Eggplant: 160 Hz  Artichoke: 171.2 Hz  Television: 165 Hz  Radio: 161 Hz  Symphony: 155 Hz  Elephant: 170 Hz  Kitten: 161 Hz  Mongoose: 177 Hz  Eagle: 177 Hz  Parrot: 174 Hz  Parakeet: 177 Hz    Average pitch at word level: 170 Hz    She produced five sentences at the following average pitch:    Sentence 1: 176 Hz  Sentence 2: 165 Hz  Sentence 3: 158 Hz  Sentence 4: 166 Hz  Sentence 5: 164 Hz    Average pitch at sentence level: 165 Hz    Average pitch at the structured conversation level was not obtained due to time constraint.    STG5: Forward Oral Resonance  No data obtained due to time constraint.     STG6: Breathiness  No data obtained due to time constraint.    STG7: Verbal Communication  No data obtained due to time constraint.    STG8:  Nonverbal Communication  No data obtained due to time constraint.    Assessment    STG1 Vocal Hygiene: Ms. Calvillo was aware of her vocal hygiene and was open to new strategies for increased daily water intake.  Increasing her water intake would be an appropriate therapy target.    STG2: Vocal Relaxation:  Ms. Calvillo correctly named and demonstrated all vocal relaxation exercises independently.  However, she does not complete all exercises outside of therapy.  Vocal relaxation exercises are an appropriate target for outside of therapy.  Additionally, these will be targeted as a warm up activity for vocal exercises.    STG3 Breath Support:  Ms. Calvillo correctly named and demonstrated breath support exercises independently.   Breath support exercises are an appropriate target for outside and inside of therapy because she has difficulty maintaining breath support for her desired pitch at the sentence level or higher.    STG4: Pitch: Ms. Calvillo identified her ability to achieved a higher pitch as her primary strength from the previous treatment period followed by oral-forward resonance. She identifed her ability to maintain oral-forward resonance (in the beginning of the treatment period) and maintaining breathiness (especially when exaggerating) as challenging for her during the previous treatment period.     Her priorities for this treatment period include speaking in her desired voice both at therapy and outside of therapy as well as maintaining her desired voice for sustained amounts of time in phrases, sentences, and eventually conversation.    Although the data were not representative, Max was able to increase her pitch at the word and sentence level when prompted. Pitch is an appropriate target for outside of and inside therapy because she had difficulty maintaining her pitch for longer than a sentence.     Plan    STG1 Vocal Hygiene:  Ask Ms. Calvillo about strategies implemented over the week to increase daily liquid intake. Provide 1-2 minute presentation on vocal folds and why hydration is important.    STG2 Vocal Relaxation: Introduce and practice vocal relaxation exercises.Encourage tongue protrusion exercise. Determine frequency for outside of therapy.    STG3 Breath Support: Introduce and practice breath support exercises.  Determine frequency for outside of therapy.    STG4 Pitch: Review baseline values she recorded. Practice desired pitch at sentence level.    STG5 Oral-forward resonance: Collect baseline data at word, sentence, and structured conversation level.     STG6 Breathiness: Collect baseline data at word, sentence, and structured conversation level.     STG7 Verbal Communication: Collect baseline data  (intonation, extended vowels, increased quantity of adjectives and adverbial clauses) at the sentence level.    STG8 Nonverbal Communication:  Collect baseline data on aspects of nonverbal communication (hand movement, gestures, head movement, smiling, stance, gait). Discuss feminine aspects of nonverbal communication.       Homework: Nonverbal communication activity.

## 2022-02-09 ENCOUNTER — TELEMEDICINE (OUTPATIENT)
Dept: SPEECH THERAPY | Facility: OTHER | Age: 27
End: 2022-02-09
Payer: COMMERCIAL

## 2022-02-09 DIAGNOSIS — R49.8 OTHER VOICE AND RESONANCE DISORDERS: ICD-10-CM

## 2022-02-09 PROCEDURE — 92507 TX SP LANG VOICE COMM INDIV: CPT | Mod: GT,GC,GN

## 2022-02-10 NOTE — OP THERAPY DAILY TREATMENT
Subjective    This treatment session was conducted via [employer], a secure and encrypted videoconferencing technology. The patient was in her home in the state of [place].  The patient's identity was confirmed and verbal consent was obtained for this episode of virtual visits. This session was supervised by a licensed and certified speech-language pathologist, who was available for 100% of the session.    Ms. Calvillo signed on to Zoom promptly at 3 pm. Ms. Calvillo was positive and actively engaged in conversation and exercises. The session was briefly interrupted due to poor clinic internet connection.    Because this was the second session of the Spring 2022 Treatment Period, collection of baseline data was continued for short-term goals 5 to 8.     Objective  The purpose of this session was to finish collecting baseline data and to start implementing therapy targets.     STG1: Vocal Hygiene  No data obtained due to time constraint.    STG2: Vocal Relaxation  Ms. Calvillo independently produced all three vocal relaxation techniques (I.e., laryngeal massage, yawn-sigh, tongue protrusion). She reported that she regularly used the laryngeal massage and yawn-sigh exercises outside of therapy, but did not perform the tongue protrusion outside of therapy.    STG3: Breath Support  Ms. Calvillo independently performed yoga breath and the snuff-hiss. Then, Ms. Calvillo received a short lesson on the importance of sustained breath support to sustain a higher pitch for a lengthier time. She also learned and practiced a new exercise called flow phonation.    STG4: Pitch  No data obtained due to time constraint.    STG5: Oral Forward Resonance  Ms. Calvillo independently and accurately described oral forward resonance. She independently produced 5/5 words with oral forward resonance. She maintained oral forward resonance for 2/5 sentences. She did not maintain oral forward resonance at the structured conversation level (0/5  conversations).    STG6: Breathiness  Ms. Calvillo maintained light-contact breathiness with 100% (5/5) accuracy at the word level and 100% (5/5) accuracy at the sentence level. Ms. Calvillo maintained light-contact breathiness with 0% (0/5) accuracy at the structured conversation level.    STG7: Verbal Communication  Ms. Calvillo named feminine aspects of verbal communication. She exhibited multiple feminine aspects of verbal communication with 20% (1/5) accuracy at the phrase level. Specifically at the phrase level, she extended vowels with 47% (7/15) accuracy, short bursts with 14% (2/14) opportunities, and rising intonation with 27% (4/15) accuracy.   At the paragraph level, she demonstrated inconsistent use of extending vowels, rising intonation, and speaking in short bursts.      STG8: Nonverbal Communication  No data obtained due to time constraint. However, the topic was introduced and homework was assigned.       Assessment    STG2 Vocal Relaxation: Ms. Calvillo completed three exercises independently and with ease. She said that she only does these exercises three to four times a week and usually only to warm up for her other voice exercises. She set a goal to practice all three exercises at least once a day for the next week.    STG3 Breath Support: Ms. Calvillo completed yoga breath and the snuff-hiss independently and with ease. She was receptive to a lesson on the importance of sustained, even breath support to achieve her desired voice at a sustined level. She quickly learned a new exercise (flow phonation). She mention that she only does yoga breath and the snuff-hiss three to four times a week and only to warm up for her other voice exercises. She set a goal to practice yoga breath, the snuff-hiss, and flow phonation at least once a day for the next week.    STG5 Oral Forward Resonance: Maintaining oral forward resonance at the word level was not difficult for Ms. Calvillo.  However, it was  difficult for her to maintain at the sentence level.  Although she started each sentence with oral forward resonance, she tended to drop into her neck.  Additionally, she performed similarly at the structured conversation level. Oral forward resonance exercises are an appropriate target for outside and inside of therapy because she has difficulty maintaining oral forward resonance at the sentence level or higher.    STG6 Breathiness: Ms. Calvillo maintained light-contact breathiness at the word and sentence level, but had a harder time maintaining at the conversation level.  She often started structured conversation prompts with good light-contact breathiness, but lost the breathiness with increased cognitive load. Light-contact breathiness is an appropriate target for outside and inside of therapy because it assists in maintaining forward oral resonance and desired pitch.    STG7 Verbal Communication: With light clinician prompting, Ms. Calvillo defined various feminine aspects of verbal communication (e.g., more descriptive and wordy utterances, tag questions, chunked speaking rate). Overall, Ms. Calvillo did not independently sustain mulitple feminine aspects of verbal communication at the phrase or paragraph level. She utilized extended vowels and adverbial clauses in isolation. Although she exhibited short bursts of phrases, conversational elaboration, rising intonation/varied inflection, and tag questions, there were many opportunities that these aspects were not demonstrated. Ms. Calvillo was able to independently exhibit multiple feminine aspects at the structured conversation level due to the variability of the open ended prompt that allowed for inclusion of adverbial clauses, tag questions, and conversational elaboration. Feminine aspects of verbal communication are an appropriate target for outside and inside of therapy because Ms. Calvillo had difficulty sustaining feminine aspects of verbal  communication to support her desired voice.    STG8 Nonverbal Communication: No data obtained due to time constraint. Feminine aspects of nonverbal communication is an appropriate target for outside and inside of therapy because Ms. Calvillo's short term goal was not met during the Fall 2021 Treatment Period and these aspects of nonverbal communication will support her desired voice.  Assigned homework: While watching the first 15-20 minutes of [facility], take notes on masculine and feminine aspects of nonverbal communication and body language. Be prepared to share notes and thoughts in the next session.    Plan    STG1 Vocal Hygiene: Ask Ms. Calvillo about strategies implemented over the past two weeks to increase daily liquid intake. Provide 1-2 minute presentation on vocal folds and why hydration is important.    STG2 Vocal Relaxation: Practice vocal relaxation exercises. Discuss practice outside of therapy.      STG3 Breath Support: Practice breath support exercises. Introduce flow phonation skill one part B (with phonation). Discuss practice outside of therapy.    STG4 Pitch: Review baseline values she recorded. Practice desired pitch (220 hz) at sentence level.  Discuss challenges with room for prosody and sustain at 220 Hz.      STG5 Oral-Forward Resonance: Practice oral-forward resonance at the sentence level.    STG6 Light-Contact Breathiness: Practice light-contact breathiness at the sentence level.    STG7 Verbal Communication: Review and practice extended vowels at the word and sentence level. Review and practice chunked speaking rate (I.e., short bursts) at the sentence level.    STG8 Nonverbal Communication: Compare notes from [facility] homework. Practice nonverbal communication at the sentence level. Practice body movement (e.g., fluid, delicate movement from the elbows down) in isolated actions such as picking up the phone, texting, and holding a bottle of water to take a  sip.    Homework: Nonverbal communication activity

## 2022-02-16 ENCOUNTER — TELEPHONE (OUTPATIENT)
Dept: SPEECH THERAPY | Facility: OTHER | Age: 27
End: 2022-02-16

## 2022-02-16 ENCOUNTER — APPOINTMENT (OUTPATIENT)
Dept: SPEECH THERAPY | Facility: OTHER | Age: 27
End: 2022-02-16
Payer: COMMERCIAL

## 2022-02-23 ENCOUNTER — TELEMEDICINE (OUTPATIENT)
Dept: SPEECH THERAPY | Facility: OTHER | Age: 27
End: 2022-02-23
Payer: COMMERCIAL

## 2022-02-23 DIAGNOSIS — R49.8 OTHER VOICE AND RESONANCE DISORDERS: ICD-10-CM

## 2022-02-23 PROCEDURE — 92507 TX SP LANG VOICE COMM INDIV: CPT | Mod: GT,GN,GC

## 2022-02-24 NOTE — OP THERAPY DAILY TREATMENT
Subjective    This treatment session was conducted via Hotreader, a secure and encrypted videoconferencing technology. The patient was in her home in the state [facility].  The patient's identity was confirmed and verbal consent was obtained for this episode of virtual visits. This session was supervised by a licensed and certified speech-language pathologist, who was available for 100% of the session.    Ms. Calvillo signed on to Zoom promptly at 3 pm. She was positive and engaged in conversation and exercises.     Objective    Long-Term Goal 1: Ms. Calvillo will achieve a more feminine, gender-congruent voice in a safe and efficient manner.    STG1: Vocal Hygiene   Ms. Calvillo reported that she drank about 5 bottles (16.9 fl oz) of water per day, but that the increase was due to her recent illness. She agreed to attempt to drink five bottles of water a day for three days before the next session. Ms. Calvillo received a short lesson on the importance of hydration. Specifically, she learned how hydration can help prevent vocal abuse and conditions that can result from vocal abuse (e.g., vocal nodules, vocal polyps).     STG2: Vocal Relaxation  Ms. Calvillo independently performed all three vocal relaxation techniques (I.e., laryngeal massage, yawn-sigh, tongue protrusion).     STG3: Breath Support  Ms. Calvillo performed the snuff-hiss independently and performed yoga breath with minimal clinician support. She learned an additional step of the flow phonation exercise.    STG4: Pitch  Ms. Calvillo independently produced sentences at an average pitch of 170.6 Hz. The clinician spoke with Ms. Calvillo about lowering the target pitch (220 Hz, A3) to 180 Hz (F3-F#3) to achieve a more natural sounding (i.e., increased inflection) and more sustainable voice.    STG5: Oral-Forward Resonance  Ms Calvillo independently maintained oral-forward resonance with 100% accuracy (5/5) at the sentence level.    STG6: Light-Contact  Breathiness  Ms. Calvillo independently maintained light contact breathiness with 60% (3/5) accuracy at the sentence level.     STG7: Verbal Communication  Ms. Calvillo extended vowels with 100% (5/5) accuracy at the word level with moderate clinician support. She extended vowels with 100% (5/5) accuracy at the sentence level with moderate clinician support. She exhibited a chunked speaking rate with 100% (5/5) accuracy at the sentence level with minimal clinician support.     STG8: Nonverbal Communication  Ms. Calvillo discussed aspects of nonverbal communication observed from the homework assignment:  · Women keep elbows in against body  · Men's hand gestures are more straight-forward and in front of the body, while women's hand gestures are closer to body and are more fluid with more wrist movement.  · Women nodded more in conversation  · Women kept hands near the same latitude as torso, while men had more vertical range with hand gestures  · Men leaned forward more to assert presence    Max selected two aspects of nonverbal communication to practice as homework: 1) keeping elbows close to body while producing fluid hand movements from wrist and 2) more expressive facial expressions (e.g., smile more, eyebrow movement).    Max mimed three actions to practice feminine body movement: answering the phone, texting, and drinking from a bottle of water.    Assessment    STG1 Vocal Hygiene: Ms. Calvillo said the lesson on vocal abuse and prevention of functional voice disorders was new information. She was receptive and said that it would be good motivation to increase her daily fluid intake.     STG2 Vocal Relaxation: Ms. Calvillo completed all three exercises independently and with ease. She appeared more confident when she performed the tongue protrusion.     STG3 Breath Support: Ms Calvillo completed the snuff hiss independently and performed yoga breath with minimal clinician support in the form of counting  out the breaths. Ms. Calvillo learned an additional step to the flow phonation exercise, which required her to repeat whispered phrases on a single breath. She noted that she can repeat phrases for a longer length of time when speaks quieter than when she says the phrases louder, indicative of an understanding of the importance of breath support for sustained speech. She connected the breath support to her experience playing the [employer] in high school.     STG4 Pitch: Ms. Calvillo's voice sounded more natural and her average pitch increased when she increased inflection.She was receptive to a conversation about reducing the target pitch to 180 Hz (F3 - F#3) with the goal to create a more natural sounding voice (e.g., room for more pitch variability) and a more sustained voice. Ms. Calvillo did not use an increased pitch in between activities.    STG5 Oral-Forward Resonance: Clinician modelling that compared oral-forward resonance with a flat affect to oral-forward resonance with inflection and prosody was helpful as noted by her Ms. Calvillo increased her use of inflection while maintaining oral-forward resonance. STG6 Light-Contact Breathiness: Ms. Calvillo started all sentence-length utterances with light-contact breathiness, but on 2/5 sentences she lost the breathiness FDC through the sentence by exhibiting vowels with hard contact and less pronounced, drawn-out /h/sVarsha Calvillo improved light-contact breathiness when the clinician described how she lost light-contact breathiness FDC through the sentence and modeled one sentence to show the difference between light-contact breathiness and no light-contact breathiness. Ms. aClvillo was then able to maintain light-contact breathiness for all five sentences.     STG7 Verbal Communication:   Ms. Calvillo exhibited more mastery extending vowels at the word level than the sentence level. With clinician modeling, she was able to extend vowels for all  "words and sentences. She was receptive to the suggestion to add more sassiness to achieve the extended vowels. She agreed that although sassiness might not always be her default tone, it was helpful to achieve extended vowels. When Ms. Calvillo practiced chunked speaking rate at the extended sentence level,she was reminded before reading that the point of the exercise was to achieve a speaking rate with short bursts of speech. She demonstrated her understanding of chunked speaking rate by defining it as \"making it sound like there are a lot of extra commas.\"    STG8 Nonverbal Communication:  When discussing challenges with using feminine aspects of nonverbal communication, she noted that she often presents with an expressionless face and needs to work on incorporating more facial expressions. She said that it is easiest to practice nonverbal communication in the private space of her home, but also noted that she does not have a lot of experience practicing nonverbal communication in the presence of anyone besides her live-in partner since she does not leave the house often. She picked two aspects of nonverbal communication to practice as homework: keeping elbows close to the body while using fluid hand gestures with increased wrist movement; increased facial expressions via increased eyebrow movement and smiling. When Ms. Calvillo mimed activities, she and the clinician focused on feminine finger positioning (e.g., picking up the phone with extended fingertips, gripping items delicately, holding the phone near the base as if she has fingers too short to hold the phone).      Plan    STG1 Vocal Hygiene: Ask Ms. Calvillo about HW (i.e., drink 5 bottles of water at least three days a week and take notes on barriers). Implement daily fluid intake goal (created by both client and clinician).    STG2 Vocal Relaxation: Practice vocal relaxation exercises. Discuss practice outside of therapy.    STG3 Breath Support: " Practice breath support exercises. Introduce flow phonation skill two, part A. Discuss practice outside of therapy.    STG4 Pitch: Practice new target pitch (180 Hz at F3-F#3) at sentence level. Encourage Ms. Calvillo to maintain target pitch throughout entire session (in between activities too).     STG5 Oral-Forward Resonance: Practice oral-forward resonance at the sentence level.    STG6 Light-Contact Breathiness: Practice light-contact breathiness at the sentence level.    STG7 Verbal Communication: Practice extended vowels at the word and sentence level. Practice chunked speaking rate (i.e., short bursts) at the structured conversation level.    STG8 Nonverbal Communication: Ask how homework assignment worked. If Ms. Calvillo was able to implement increased facial expressions and specified arm, wrist, and hand movement, then assign another feminine aspect of nonverbal communication to add to weekly practice. Practice body movement in isolated actions (e.g., answering the phone, putting on glasses).

## 2022-03-02 ENCOUNTER — TELEMEDICINE (OUTPATIENT)
Dept: SPEECH THERAPY | Facility: OTHER | Age: 27
End: 2022-03-02
Payer: COMMERCIAL

## 2022-03-02 DIAGNOSIS — R49.8 OTHER VOICE AND RESONANCE DISORDERS: ICD-10-CM

## 2022-03-02 PROCEDURE — 92507 TX SP LANG VOICE COMM INDIV: CPT | Mod: GT,GN,GC

## 2022-03-03 NOTE — OP THERAPY DAILY TREATMENT
Subjective    This treatment session was conducted via Veotag, a secure and encrypted videoconferencing technology. The patient was in their home in the state [facility].  The patient's identity was confirmed and verbal consent was obtained for this episode of virtual visits. This session was supervised by a licensed and certified speech-language pathologist, who was available for 100% of the session.    Ms. Calvillo signed on to Zoom at 3 pm. She was positive and engaged in conversation and exercises.    Objective    Long-Term Goal 1: Ms. Calvillo will achieve a more feminine, gender-congruent voice in a safe and efficient manner.     STG1: Vocal Hygiene  Ms. Calvillo reported that her goal to drink 5 bottles (16.9 fl-oz) for three days started well, but was thwarted due to personal issues at home. The clinician and Ms. Calvillo established a new goal to work on over the next week: 4 bottles per day for three days.    STG2: Vocal Relaxation  Ms. Calvillo independently performed all three vocal relaxation exercises (e.g., laryngeal massage, yawn-sigh, tongue protrusion) to prepare her laryngeal mechanism. She reported that she does each exercise once a day prior to practicing her voice work.    STG3: Breath Support  Ms. Calvillo independently performed yoga breath and the snuff-hiss. She learned an additional step of the flow phonation exercise. She reported that she does each exercise one a day prior to practicing her voice work.    STG4: Pitch  Ms. Calvillo independently produced sentences at an average pitch of 159 Hz.    STG5: Oral-Forward Resonance  Ms. Calvillo independently maintained oral-forward resonance with 100% accuracy (5/5) at the sentence level.    STG6: Light-Contact Breathiness  Ms. Calvillo independently maintained light-contact breathiness with 100% accuracy (5/5) at the sentence level.     STG7: Feminine Aspects of Verbal Communication  Ms. Calvillo independently extended vowels with 90%  "(9/10) accuracy at the word level. She independently extended vowels with 100% (10/10) accuracy at the sentence level. She independently exhibited a chunked speaking rate with 80% accuracy (4/5) at the structured conversation level.     STG8: Feminine Aspects of Nonverbal Communication  Ms. Calvillo mimed three actions to practice feminine body movement: answering the phone, taking off/putting on glasses, itching her head. She reported that she practiced her homework at least once a day, but only in the mirror. Her homework was to use two preselected feminine aspects of nonverbal communication (increased facial expression and tucked elbows with increased fluid wrist/hand movement) at least once a day. The clinician and her created a goal for the following week: Practice the same pre-selected aspects of nonverbal communication with her partner or online friends at least once a day.    Assessment    STG1 Vocal Hygiene  Ms. Calvillo reported that it was easier to increase her daily water intake when she intentionally kept a bottle of water next to her. She said that as long as the bottle is close, she sips on it. She also reported that whenever her water gets low she makes a point to get up and get a new bottle of water. Ms. Calvillo was receptive to lowering the weekly goal to a more achievable target: 4 bottles of water a day for three days.     STG2 Vocal Relaxation  Ms. Calvillo completed the exercises with routine ease. She said that she usually practices her exercises in the morning or late at night after her partner has gone to bed.    STG3 Breath Support:  Ms. Calvillo performed yoga breath and the snuff-hiss with routine ease. She did her own counting for the yoga breath. She quickly learned Skill 2 of flow phonation, which required her to exhale vocalized phonemes (i.e., /u/) and exhale transitions from voiceless fricatives to voiced fricatives (e.g., \"s\" to \"z\", \"f\" to \"v\"), and produce vocalized lip " "bubbles for as long as possible. She stated that she felt comfortable practicing these independently until the next session.    STG4 Pitch:  Ms. Calvillo produced natural-sounding sentences at an increased pitch. She was able to maintain her pitch for whole sentences. Additionally, Ms. Calvillo made an effort to maintain increased pitch for the duration of the session (e.g., for activities and in between activities). She occasionally dropped her pitch and when she talked for extended amounts of time (e.g., structured conversation prompts, general conversation), but overall maintained a higher pitch for short responses. Ms. Calvillo was presented with the same set of sentences from the previous session to build off of her practice between sessions.    STG5 Oral Forward Resonance:  Ms. Calvillo maintained oral-forward resonance while also maintaining an average pitch of 165 Hz and using inflection. She learned about pretending she has a unicorn horn on her nose/forehead that all the sound exits through. She finished the oral-forward resonance task by speaking with a finger on her nose to be intentional about where the sound resonated.  Ms. Calvillo was presented with the same set of sentences from the previous session to build off of her practice between sessions.    STG6 Light-Contact Breathiness:  Ms. Calvillo maintained light-contact breathiness while maintaining an average pitch of 162 Hz and while using inflection. She used slightly less inflection in the light-contact breathiness task than she used when she focused on oral-forward resonance. She had difficulty with the word \"night.\" It is possible that this difficulty was due to two factors:  - The word was at the end of the sentence, so it was difficult to express it with normal inflection while softening the vowel and drawing out the /h/.  - The vowel and /h/ are sandwiched between a nasal (I.e., /n/) and a plosive (I.e., /t/).  Ms. Calvillo reported a " "new tip that she used to successfully say \"night\" with light-contact breathiness: flatten the tongue so that the lateral edges of the tongue touch the insides of the molars. She learned this trick from [employer].  Ms. Calvillo was presented with the same set of sentences from the previous session to build off of her practice between sessions.    STG7 Feminine Aspects of Verbal Communication  Ms. Calvillo confidently expressed extended vowels in words and used inflection in 9/10 words. She repeated the word \"enemy\" until it sounded more natural. When she first said it, she evenly extended each vowel so the word sounded flat without emphasis. With clinician modeling and cuing, she extended vowels and used emphasis when she said \"enemy.\" When she displayed chunked speaking rate at the structured conversation level, she dropped her pitch on one prompt. On another prompt she got excited talking about a subject and stopped chunking her speech rate, but recovered the chunked speaking rate when she answered follow-up questions.    STG8 Feminine Aspects of Nonverbal Communication  Ms. Calvillo confidently displayed feminine body language when she mimed answering the phone. She held the phone at the base with spread out, extended fingers. She even tilted her head in a feminine way when she held the phone to her head. When Ms. Calvillo mimed taking off/putting on her glasses, she used just her thumb and pointer finger to  the frame and kept her hands open. Although it could not be specied what could be improved upon at the time, it is likely that increasing bend in the wrist will provide a more feminine appearance for this activity. When Ms. Calvillo itched her head, she itched with four fingers. Upon instruction, she successfully itched her head with one finger to give a more feminine appearance. She was receptive to the rationale for miming activities, which was that it is important to draw feminine " intention to various actions that she completes on a daily basis.     Nonverbal Communication Homework: Ms. Calvillo reported that she practiced her preselected feminine aspects of nonverbal communication alone in the mirror. She practiced movements that she thought she might perform if she gets a job at [employer] (e.g., scooping, folding, using tongs as she moves a customer down the food line). This practice was indicative of motivation to increase implementation of feminine aspects into her daily life and hopefully new work life.     Plan    STG1 Vocal Hygiene: Ask Ms. High about HW (drink 4 bottles of water at least three days a week and take notes on barriers and things that helped). Continue or modify daily fluid intake goal (decided by both client and clinician).    STG2 Vocal Relaxation: Practice vocal relaxation exercises.    STG3 Breath Support: Practice breath support exercises. Introduce flow phonation skill 2, part B.    STG4 Pitch: Practice new target pitch  (180 Hz [F3 - F#3]) at the sentence level. Encourage Ms. Calvillo to maintain target pitch throughout entire session and in between activities).      STG5 Oral-Forward Resonance: Practice oral-forward resonance at the paragraph level.    STG6 Light-Contact Breathiness: Practice light-contact breathiness at the paragraph level    STG7 Verbal Communication: Practice extended vowels at the structured conversation level. Practice chunked speaking rate (I.e., short bursts) at the structured conversation level. Practice conversational elaboration at the sentence level.     STG8 Nonverbal Communication: Ask how homework assignment went. If Ms. Calvillo was able to implement increased facial expressions and specified arm, wrist, and hand movement, then assign another feminine aspect of nonverbal communication to add to weekly practice. Practice body movement in isolated actions (e.g., putting on/taking off glasses, eating crackers) OR practice  gait.

## 2022-03-09 ENCOUNTER — TELEMEDICINE (OUTPATIENT)
Dept: SPEECH THERAPY | Facility: OTHER | Age: 27
End: 2022-03-09
Payer: COMMERCIAL

## 2022-03-09 DIAGNOSIS — R49.8 OTHER VOICE AND RESONANCE DISORDERS: ICD-10-CM

## 2022-03-09 PROCEDURE — 92507 TX SP LANG VOICE COMM INDIV: CPT | Mod: GT,GN,GC

## 2022-03-10 NOTE — OP THERAPY DAILY TREATMENT
Subjective    This treatment session was conducted via [employer], a secure and encrypted videoconferencing technology. The patient was in her home in the [facility].  The patient's identity was confirmed and verbal consent was obtained for this episode of virtual visits. This session was supervised by a licensed and certified speech-language pathologist, who was available for 100% of the session.    Ms. Calvillo had technical difficulties signing on to [employer], but was able to sign on by 3:04. She was positive and engaged in activities.     Objective    Long-Term Goal 1: Ms. Calvillo will achieve a more feminine, gender-congruent voice in a safe and efficient manner.     STG1: Vocal Hygiene  Ms. Calvillo reported that she almost met her daily water intake goal. She drank 4 bottles (16.9 fl-oz) for 2/3 days. She decided to keep the current goal until the following session: Drink 4 bottles of water a day for three days per week.    STG2: Vocal Relaxation  Ms. Calvillo independently performed all three vocal relaxation exercises (e.g., laryngeal massage, yawn-sigh, tongue protrusion) to prepare her laryngeal mechanism. She reported that she does each exercise once a day prior to practicing her voice work.    STG3: Breath Support  Ms. Calvillo independently performed yoga breath and the snuff-hiss. She learned an additional step of the flow phonation exercise (Step 2, Part B). She reported that she does each exercise one a day prior to practicing her voice work.    STG4: Pitch  Ms. Calvillo independently produced sentences at an average pitch of 164 Hz.    STG5: Oral-Forward Resonance  Ms. Calvillo independently maintained oral-forward resonance with 40% accuracy (2/5) at the paragraph level.     STG6: Light-Contact Breathiness  Ms. Calvillo independently maintained light-contact breathiness with 60% accuracy (3/5) at the paragraph level.    STG7: Feminine Aspects of Verbal Communication  No data obtained due to time  constraint.    STG8: Feminine Aspects of Nonverbal Communication  Ms. Calvillo was instructed on specific elbow and wrist movement for arm extensions and gesturing. She reported that she achieved her homework goal (implement increased facial expression and tucked elbows with increased fluid wrist/hand movement at least once a day with another person). The clinician assigned a new goal: practice reaching movement and grabbing movement with specific attention to the wrist and elbows.       Assessment    STG1 Vocal Hygiene  Ms. Calvillo reported that it was easier to drink more water when she ate more snacks and had more time to relax. She said that the reason she did not meet her goal on the third day was because she just wasn't thirsty and it felt like too much water. She was enthusiastic about trying to meet the goal for a third week.    STG2 Vocal Relaxation  Ms. Calvillo independently completed the exercises with routine ease. She said that she continues to practice the exercises once a day, right before she does her voice exercises.     STG3 Breath Support  Ms. Calvillo performed yoga breath and the snuff-hiss with routine ease. She did her own counting for the yoga breath. She quickly learned Skill 2, Part B of flow phonation, which required her to exhale vocalized phrases (e.g., meza-mick-meza-mick, Who is Savana?). She was re-taught the introductory exercise to Skill 2 for parts A and B, which is exhaling /u/ on a downslide to better prepare the vocal folds. She stated that she felt comfortable practicing these independently until the next session.    STG4 Pitch  Ms. Calvillo produced natural-sounding sentence at an increased pitch. Her average pitch was 5 Hz higher than the previous session (from 159 Hz to 164 Hz). She was able to maintain her pitch for whole sentences during the exercise. She made an effort to maintain increased pitch for the duration of the session, but often dropped her pitch when she responded  "to questions (e.g., \"sure,\" \"OK\") during tasks and in between tasks. She had a recorded average pitch of 154 Hz when she read paragraphs for activities that addressed aspects other than pitch (e.g., oral-forward resonance, light-contact breathiness). However, it is possible that the voice measurement software, [employer] Tools, did not provide accurate measurements--Ms. Calvillo's pitch sounded much higher than the recorded 154 Hz. Her pitch dropped dramatically when she engaged in conversation about feminine aspects of nonverbal communication.     STG5 Oral-Forward Resonance  The task difficulty level was increased from the previous session (i.e., from sentence level to paragraph level), which challenged Ms. Calvillo to maintain oral-forward resonance for the duration of the tasks. She was able to read approximately 50% to 66%of paragraphs before her resonance started to drop into her throat. She had the most difficulty when she lost her place in the reading and when she read words that contain a short \"a\" (e.g., [place], satisfaction, activity) or a long \"e\" (e.g., easy).       STG6 Light-Contact Breathiness  The task difficulty level was increased from the previous session (i.e., from sentence level to paragraph level), which challenged Ms. Calvillo to maintain light-contact breathiness for the duration of tasks. When she read the first paragraph, her voice kept getting higher and higher to the point of mono-tone. This prompted a discussion about Ms. Calvillo's preferred voice. She and the clinician agreed that her preferred voice was actually lower than the original 220 Hz she was aiming for. She agreed that a slightly lower target pitch allowed for more pitch variation (i.e., inflection). When Ms. Calvillo read the second paragraph, with clinician coaching (i.e., modelling, analogies) she increased her breathiness and use of light-contact production, while maintaining an average pitch of 154 Hz, which allowed " "for excellent pitch variation. Ms. Calvillo read the last three paragraphs with improved prosody and inflection while maintaining light-contact breathiness. She responded well to the praise that she \"read with a lot of personality.\" Its possible the phrase helped Ms. Calvillo remember to produce vocalizations with intonation.     STG8 Feminine Aspects of Nonverbal Communication  Ms. Calvillo was able to lightly mimic the elbow and wrist movement when it was demonstrated by the clinician, but improved drastically when functional examples (e.g., opening the fridge and reaching in the fridge to grab an item) were attached to the elbow and wrist movement. Ms. Calvillo stood up and used her whole body to practice the movements.     Plan    STG1 Vocal Hygiene: Ask Ms. Calvillo about her weekly homework goal (drink 4 bottles of water at least three days a week and take notes on barriers and facilitators to success). Continue or modify daily fluid intake goal.     STG2 Vocal Relaxation: Practice vocal relaxation exercises.    STG3 Breath Support: Practice breath support exercises. Introduce flow phonation Skill 3.    STG4 Pitch: Review self-reflection scale. Practice target pitch (180 Hz, F3-F#3) at the sentence level. Encourage Ms. Calvillo to maintain target pitch throughout the entire session. Practice the phrases, \"OK\" and \"sure\" at the word level.    STG5 Oral-Forward Resonance: Practice oral-forward resonance at the paragraph level. Practice oral-forward resonance on words with short \"a\"s and long \"e\"s at the word level.    STG6 Light-Contact Breathiness: Practice light-contact breathiness at the paragraph level.    STG7 Feminine Aspects of Verbal Communication:  Practice extended vowels at the structured conversation level. Practice chunked speaking rate (I.e., short bursts) at the structured conversation level. Practice conversational elaboration at the sentence level. Have discussion about direction she wants " to take her verbal communication. Does she have any speech role-models? What did she observe while at the [facility] meet-up?    STG8 Feminine Aspects of Nonverbal Communication: Ask Ms. Calvillo about her homework. Review arm extension and gesturing exercises. Ask Ms. Calvillo to practice gestures while explaining how to do tasks (e.g., how to chop an onion, how to make a specific meal, how to get to the grocery store from her house).

## 2022-03-23 ENCOUNTER — TELEPHONE (OUTPATIENT)
Dept: SPEECH THERAPY | Facility: OTHER | Age: 27
End: 2022-03-23
Payer: COMMERCIAL

## 2022-03-30 ENCOUNTER — TELEPHONE (OUTPATIENT)
Dept: SPEECH THERAPY | Facility: OTHER | Age: 27
End: 2022-03-30
Payer: COMMERCIAL

## 2022-04-06 ENCOUNTER — TELEMEDICINE (OUTPATIENT)
Dept: SPEECH THERAPY | Facility: OTHER | Age: 27
End: 2022-04-06
Payer: COMMERCIAL

## 2022-04-06 DIAGNOSIS — R49.8 OTHER VOICE AND RESONANCE DISORDERS: ICD-10-CM

## 2022-04-06 PROCEDURE — 92507 TX SP LANG VOICE COMM INDIV: CPT | Mod: GT,GN,GC

## 2022-04-07 NOTE — OP THERAPY DAILY TREATMENT
Subjective     This treatment session was conducted via [employer], a secure and encrypted videoconferencing technology. The patient was in her home in the [facility].  The patient's identity was confirmed and verbal consent was obtained for this episode of virtual visits. This session was supervised by a licensed and certified speech-language pathologist, who was available for 100% of the session.    Ms. Calvillo signed on to Zoom promptly at 3 pm. This was her first therapy session in three weeks due to illness and clinic closure over spring break. She was in high spirits and engaged and interacted in all activities.      Objective     Long-Term Goal 1: Ms. Calvillo will achieve a more feminine, gender-congruent voice in a safe and efficient manner.     STG1: Vocal Hygiene  Ms. Calvillo reported that she did not stay consistent with her goal. She decided to keep the current goal until the following session: Drink 4 bottles of water a day for three days per week.     STG2: Vocal Relaxation  Ms. Calvillo independently performed all three vocal relaxation exercises (e.g., laryngeal massage, yawn-sigh, tongue protrusion) to prepare her laryngeal mechanism. She reported that over the past three weeks, she had done about 1 week's worth of exercises.     STG3: Breath Support  Ms. Calvillo independently performed yoga breath and the snuff-hiss. She learned an additional step of the flow phonation exercise (Skill 3). She reported that she had done the equivalent of 1 week of exercises over the past three weeks.     STG4: Pitch  Ms. Calvillo independently produced sentences at an average pitch of 163.2 Hz.     STG5: Oral-Forward Resonance  Ms. Calvillo independently maintained oral-forward resonance with 86% accuracy (6/7 words) at the word level (I.e., words containing low vowels). Ms. Calvillo maintained oral-forward resonance with 80% accuracy (4/5) at the paragraph level.     STG6: Light-Contact Breathiness  Ms.  "Karli independently maintained light-contact breathiness with 100% accuracy (5/5) at the paragraph level.     STG7: Feminine Aspects of Verbal Communication  Ms. Calvillo produced extended vowels with 60% accuracy (3/5 prompts) at the structured conversation level. Ms. Calvillo produced a chunked speaking rate with 60% accuracy (3/5 prompts) at the structured conversation level. Ms. Calvillo used conversational elaboration with 100% accuracy (4/4 prompts) at the structured conversation level.     STG8: Feminine Aspects of Nonverbal Communication  No data obtained due to time constraint.        Assessment     STG1 Vocal Hygiene  Ms. Calvillo reported that she did not stay consistent with her goal because she had been sick for the past two weeks. She said that at times she drank too little water, and at other times she drank too much water (i.e., more than 4 bottles of water per day). She agreed keep the current goal: 4 16.9 fl-oz bottles of water for three days per week.    STG2 Vocal Relaxation  Ms. Calvillo independently completed the exercises with routine ease. She reported that since she was sick, she reduced her voice practice, and as a result reduced the amount of vocal relaxation exercises that she completed. She agreed to \"get back on the bandwagon\" before the next session.     STG3 Breath Support  Ms. Calvillo performed yoga breath and the snuff-hiss with routine ease. She did her own counting for the yoga breath. She learned Skill 3 of flow phonation, which required her to practices phrases (e.g., meza-mick-meza-mick, Who is Jennifer?, See Abril sleep soundly) with varying degrees of breathiness (breathy phonation, flow phonation, and pressed phonation). Ms. Calvillo had difficulty differentiating between breathy phonation and flow phonation (e.g., she was not sure how much breathiness to use when comparing the two phonation styles). She observed that the more breathiness she used, the higher her pitch was. " "She stated that she felt comfortable practicing these independently until the next session.     STG4 Pitch  Ms. Calvillo produced natural-sounding sentences at an increased pitch. Her recorded average pitch was 0.8 Hz lower than the previous session (from 164 Hz to 163.8 Hz). She was able to maintain her pitch for whole sentences during the exercise. She made an effort to maintain increased pitch for the duration of the session, but often dropped her pitch when she responded to questions (e.g., \"sure,\" \"OK\", \"alright\"\") during tasks and in between tasks. She had a recorded average pitch of 181 Hz when she read paragraphs for activities that addressed aspects other than pitch (e.g., oral-forward resonance, light-contact breathiness). Her pitch dropped dramatically when she responded to prompts at the structured conversation level that required increased cognitive load. Ms. Calvillo's pitch dropped consistently near the end of the therapy session. She rubbed her throat often. When asked, she said that her throat was starting to feel sore from increased use after being sick.      STG5 Oral-Forward Resonance  Ms. Calvillo was receptive to practicing words containing low vowels. When she focused on the vowels, she produced the words with oral-forward resonance with ease. She reported that it was helpful to use a technique that required her to curl her tongue slightly just inside her maxillary molars. When she practiced oral-forward resonance at the paragraph level, on the first paragraph her resonance dropped back into her throat when she encountered short vowel \"o\"s. She corrected herself and maintained oral-forward resonance for the remaining four paragraphs.       STG6 Light-Contact Breathiness  Ms. Calvillo maintained light-contact breathiness for all paragraphs, but her pitch decreased and her voice had a monotone quality. When she encountered a difficult latin word, she dropped her pitch, resonance, and " "breathiness to say, \"I can't pronounce that.\"      STG7 Feminine Aspects of Verbal Communication  On extended vowels, the difficulty level was raised from sentence level to structured conversation level, which challenged Ms. Calvillo to lengthen her vowels and resulted in a lower accuracy than the previous session (100% at the sentence level to 60% at the structured conversation level). On two of the five prompts, Ms. Calvillo began her answers with extended vowels, but transitioned to shorter vowels by the end of her answer. With regard to chunked speaking rate, although the difficulty level had not increased since the previous session, she spoke with less accuracy than the previous session (from 80% to 60% accuracy). Overall, the more involved Ms. Calvillo got with her answer, the less she was able to maintain extended vowels and a chunked speaking rate. She was asked to write visual cues to remind herself to maintain both an elevated pitch and the target behavior (e.g., rate, extended vowel). Additionally, Ms. Calvillo responded positively to clinician prompting via hand signs to increase her pitch mid-answer. Ms. Calvillo successfully demonstrated conversational elaboration with the use of multiple details and subjective clauses.        Plan     STG1 Vocal Hygiene: Ask Ms. Calvillo about her weekly homework goal (drink 4 bottles of water at least three days a week and take notes on barriers and facilitators to success). Continue or modify daily fluid intake goal.      STG2 Vocal Relaxation: Practice vocal relaxation exercises.     STG3 Breath Support: Practice breath support exercises. Introduce flow phonation Skill 4.     STG4 Pitch: Review self-reflection scale. Practice target pitch (180 Hz, F3-F#3) at the sentence level. Encourage Ms. Calvillo to maintain target pitch throughout the entire session. Practice the phrases, \"OK\" and \"sure\" at the word level.     STG5 Oral-Forward Resonance: Practice " "oral-forward resonance at the paragraph level.     STG6 Light-Contact Breathiness: Practice light-contact breathiness at the paragraph level. To increase intonation, remind Ms. Calvillo to \"read with personality\".      STG7 Feminine Aspects of Verbal Communication:  Practice extended vowels at the structured conversation level. Practice chunked speaking rate (I.e., short bursts) at the structured conversation level. Practice conversational elaboration at the sentence level. Have discussion about direction she wants to take her verbal communication. Does she have any speech role-models? What did she observe while at the [facility] meet-up?     STG8 Feminine Aspects of Nonverbal Communication: Ask Ms. Calvillo about her homework. Review arm extension and gesturing exercises. Ask Ms. Calvillo to practice gestures while explaining how to do tasks (e.g., how to chop an onion, how to make a specific meal, how to get to the grocery store from her house).     Additionally: At the beginning of the session, record Ms. Calvillo reading a passage. Have Ms. Calvillo listen to the passage to rate herself in terms of pitch, oral-forward resonance, light-contact breathiness, and feminine aspects of verbal communication.  "

## 2022-04-13 ENCOUNTER — TELEMEDICINE (OUTPATIENT)
Dept: SPEECH THERAPY | Facility: OTHER | Age: 27
End: 2022-04-13
Payer: COMMERCIAL

## 2022-04-13 DIAGNOSIS — R49.8 OTHER VOICE AND RESONANCE DISORDERS: ICD-10-CM

## 2022-04-13 PROCEDURE — 92507 TX SP LANG VOICE COMM INDIV: CPT | Mod: GT,GN,GC

## 2022-04-13 NOTE — OP THERAPY DAILY TREATMENT
Subjective     This treatment session was conducted via [employer], a secure and encrypted videoconferencing technology. The patient was in her home in the [facility].  The patient's identity was confirmed and verbal consent was obtained for this episode of virtual visits. This session was supervised by a licensed and certified speech-language pathologist, who was available for 100% of the session.     Ms. Calvillo signed on to Zoom promptly at 3 pm. There was loud construction happening at Ms. Calvillo's house, which negatively impacted the ability to accurately record Ms. Calvillo reading the Grandfather passage for a self-rating activity.     Objective     Long-Term Goal 1: Ms. Calvillo will achieve a more feminine, gender-congruent voice in a safe and efficient manner.     STG1: Vocal Hygiene  Ms. Calvillo reported that she did not meet her goal. She drank 4 bottles of water for two days. She decided to keep the current goal until the following session: Drink 4 bottles of water a day for three days per week.     STG2: Vocal Relaxation  Ms. Calvillo independently performed all three vocal relaxation exercises (e.g., laryngeal massage, yawn-sigh, tongue protrusion) to prepare her laryngeal mechanism. She reported that she performs these exercises once a day before practicing related voice exercises.      STG3: Breath Support  Ms. Calvillo independently performed yoga breath and the snuff-hiss. She learned an additional step of the flow phonation exercise (Skill 4). She reported that she performed the exercises once a day.     STG4: Pitch  Ms. Calvillo was recorded reading the Grandfather passage. When she listened to the recorded reading, she rated her pitch at a 75 out of 100 points. (0 = unfavorable pitch; 100 = ideal pitch). Ms. Calvillo independently produced single-word affirmative and negative utterances (e.g., yes, alright, ok, no, not yet) at an average pitch of 166 Hz. Ms. Calvillo independently  produced sentences at an average pitch of 167 Hz.     STG5: Oral-Forward Resonance  Based on her recording of the Grandfather passage, Ms. Calvillo self-rated her use of oral-forward resonance at 60 on a scale of 0 to 100 points. (0 = no oral-forward resonance; 100 = ideal oral-forward resonance). Ms. Calvillo maintained oral-forward resonance with 20% accuracy (1/5) at the paragraph level.     STG6: Light-Contact Breathiness  No data obtained due to time constraint.     STG7: Feminine Aspects of Verbal Communication  No data obtained due to time constraint.     STG8: Feminine Aspects of Nonverbal Communication  Ms. Calvillo and the clinician reviewed Ms. Calvillo's Self-Reflection Scale and discussed the type of nonverbal communication Ms. Calvillo identifies with. Ms. Calvillo explained how to complete various tasks while using feminine gestures.         Assessment     STG1 Vocal Hygiene  Ms. Calvillo reported that although she was able to drink four bottles of water for two days, she lost track of her water intake for the later half of the week and most likely did not drink four bottles of water for a fourth day. She agreed keep the current goal: 4 16.9 fl-oz bottles of water for three days per week.     STG2 Vocal Relaxation  Ms. Calvillo independently completed the exercises with routine ease.     STG3 Breath Support  Ms. Calvillo performed yoga breath and the snuff-hiss with routine ease. She did her own counting for the yoga breath. She learned Skill 4 of flow phonation, which required her to practice articulation while using flow phonation. This consisted of using flow phonation (as opposed to breathy phonation or pressed phonation) while reciting phrases with a heavy percentage of front-phonemes (e.g., /p, t, b, p, v, f, th, sh). Ms. Calvillo was asked to observe how the airflow produced with flow phonation affected her articulation. She repeated the phrases a few times before observing that her  pitch was naturally higher and she produced lighter articulatory contact on consonants. She stated that she felt comfortable practicing these independently until the next session.     STG4 Pitch  When discussing Ms. Calvillo's Self-Reflection Scale, Ms. Calvillo rated her pitch as mostly consistent, her resonance as somewhat inconsistent, and her breathiness as mostly inconsistent. She rated her overall voice as an 8.5/10. Ms. Calvillo and the clinicians discussed what verbal and nonverbal characteristics she identifies with. Although the conversation covered nearly all aspects of voice, it is most appropriate to address these aspects with respect to pitch.  In terms of voice, she said that she would like to increase her pitch more, use more inflection, increase her use of descriptive voice, and increase the variation of her speech patterns (I.e., a steady speaking rate when she is serious and more short bursts when she is excited). She was receptive to the idea of playing clips of other women (cis and transgender) during next week's therapy session to find a voice that she would like to work towards.     Ms. Calvillo was recorded reading the Grandfather passage. Upon listening to the reading, Ms. Calvillo said that she sounded different than she expected on the recording. She said that her recorded pitch sounded much lower than it did in her head. She and the clinician acknowledged that the recorder might not have provided the best sound quality since it was recorded and played back over zoom. Additionally, the construction work at Ms. Calvillo's residence contributed to poor sound quality.    Ms. Calvillo independently produced affirmative and negative expressions (e.g., yes, no, uh-huh, alright, not yet, ok) at an elevated pitch. Practice of affirmative and negative expressions was intended to remind Ms. Calvillo to maintain her pitch when agreeing or answering questions between tasks and drills. Ms.  "Karli produced natural-sounding sentences at an increased pitch. Her recorded average pitch was 3.2 Hz higher than the previous session (from 163.8 Hz in the previous session to 167 Hz). She was able to maintain her pitch for whole sentences during the exercise. She produced the highest pitch (174 Hz) on a scripted question (I.e., Do you know where the nearest gas station is?), exhibiting her use of rising intonation. Ms. Calvillo noted that after the previous session, her throat hurt from maintaining an elevated pitch for the entire session. She agreed with the clinician to speak at an elevated pitch for five-minute segments in an effort to work up to longer segments of time.       STG5 Oral-Forward Resonance  When Ms. Calvillo discussed her use or oral-forward resonance from the Grandfather passage recording, she reported that her resonance seemed to drop down into her throat for the second half of the passage. Ms. Calvillo's ability to maintain oral-forward resonance at the paragraph level decreased from the previous session (from 80% to 20%). Ms. Calvillo continued to lose oral-forward resonance on low vowels. Her oral-forward resonance improved when she was told to focus on short As and Os. She reported that it also helped to slow down and slightly read ahead to counter for problem words containing low vowels.     STG8 Feminine Aspects of Nonverbal Communication  When discussing how Ms. Calvillo identifies herself, she said that she sees herself as a bit of a tomboy with both masculine and feminine characteristics. When Ms. Calvillo used gestures while explaining how to complete tasks (e.g., how to cut an onion, how to get to the grocery store), she made good use of open palms/fingers and fluid wrist movement. As she got more comfortable with the exercise, her hands changed from pointed fingers, to open \"presenter\" palms that moved appropriately with the rhythm of her speech.     Plan     STG1 Vocal " "Hygiene: Ask Ms. Calvillo about her weekly homework goal (drink 4 bottles of water at least three days a week and take notes on barriers and facilitators to success). Continue or modify daily fluid intake goal.      STG2 Vocal Relaxation: Practice vocal relaxation exercises.     STG3 Breath Support: Practice breath support exercises, including flow phonation, skill 4.     STG4 Pitch: Practice target pitch (180 Hz, F3-F#3) at the sentence level. Encourage Ms. Calvillo to maintain target pitch throughout the entire session in 5-10 minute segments. Play speech clips of female actresses/public figures to determine which aspects of feminine voice resonate with Ms. Calvillo.      STG5 Oral-Forward Resonance: Practice oral-forward resonance at the paragraph level. Remind Ms. Calvillo to watch out for low vowels.     STG6 Light-Contact Breathiness: Practice light-contact breathiness at the paragraph level. To increase intonation, remind Ms. Calvillo to \"read with personality\".      STG7 Feminine Aspects of Verbal Communication:  Practice extended vowels at the structured conversation level. Practice chunked speaking rate (I.e., short bursts) at the structured conversation level. Practice conversational elaboration at the sentence level.      STG8 Feminine Aspects of Nonverbal Communication: Ask Ms. Calvillo about her homework. Review arm extension and gesturing exercises. Ask Ms. Calvillo to practice gestures while explaining how to do tasks (e.g., how to chop an onion, how to make a specific meal, how to get to the grocery store from her house).   "

## 2022-04-20 ENCOUNTER — TELEMEDICINE (OUTPATIENT)
Dept: SPEECH THERAPY | Facility: OTHER | Age: 27
End: 2022-04-20
Payer: COMMERCIAL

## 2022-04-20 DIAGNOSIS — R49.8 OTHER VOICE AND RESONANCE DISORDERS: ICD-10-CM

## 2022-04-20 PROCEDURE — 92507 TX SP LANG VOICE COMM INDIV: CPT | Mod: GT,GN,GC

## 2022-04-20 NOTE — OP THERAPY DAILY TREATMENT
Subjective     This treatment session was conducted via [employer], a secure and encrypted videoconferencing technology. The patient was in her home in the state of [place].  The patient's identity was confirmed and verbal consent was obtained for this episode of virtual visits. This session was supervised by a licensed and certified speech-language pathologist, who was available for 100% of the session.     Ms. Calvillo signed on to Zoom promptly at 3 pm. The heating system at Ms. Calvillo's house temporarily interfered with reliable recording of Ms. Calvillo's pitch, but the issue was resolved when the heat was turned off. Ms. Calvillo was in high spirits and actively engaged in the activities.     Objective    Long-Term Goal 1: Ms. Calvillo will achieve a more feminine, gender-congruent voice in a safe and efficient manner.     STG1: Vocal Hygiene  Ms. Calvillo reported that she met her goal. She drank 4 bottles of water for three days. She decided to keep the current goal until the following session: Drink 4 bottles of water a day for three days per week.     STG2: Vocal Relaxation  Ms. Calvillo independently performed all three vocal relaxation exercises (e.g., laryngeal massage, yawn-sigh, tongue protrusion) to prepare her laryngeal mechanism. She reported that she performs these exercises once a day before practicing related voice exercises.      STG3: Breath Support  Ms. Calvillo independently performed yoga breath, snuff-hiss and flow phonation skill 4.  She reported that she performed the exercises 3-4 times a week.     STG4: Pitch  Ms. Calvillo was recorded reading the Grandfather passage. When she listened to the recorded reading, she rated her pitch at a 75 out of 100 points. (0 = unfavorable pitch; 100 = ideal pitch). She reported that her voice in the recording was at a lower pitch than she expected. Ms. Calvillo independently produced sentences at an average pitch of 167 Hz.     STG5: Oral-Forward  Resonance  Based on her recording of the Grandfather passage, Ms. Calvillo self-rated her use of oral-forward resonance at 75 on a scale of 0 to 100 points. (0 = no oral-forward resonance; 100 = ideal oral-forward resonance). Ms. Calvillo maintained oral-forward resonance with 60% accuracy (3/5) at the paragraph level.     STG6: Light-Contact Breathiness  Based on her recording of the Grandfather passage, Ms. Calvillo self-rated her use of light-contact breathiness at a 40-50 on a scale of 0-100 points. (0 = no light-contact breathiness; 100 = ideal light-contact breathiness). Ms. Calvillo maintained light-contact breathiness with 80% accuracy (4/5) at the paragraph level.     STG7: Feminine Aspects of Verbal Communication  Based on her recording of the Grandfather passage, Ms. Calvillo self-rate her use of feminine aspects of verbal communication at 60 on a scale of 0-100 points. (0 = no light-contact breathiness; 100 = ideal light-contact breathiness). She extended vowels at the structured conversation level with 100% accuracy (5/5). She utilized chucked speaking rate at the structured conversation level with 100% accuracy (5/5).     STG8: Feminine Aspects of Nonverbal Communication  No drills were practiced due to time constraint. Ms. Calvillo her nonverbal communication homework four times over the past week.        Assessment     STG1 Vocal Hygiene  Ms. Calvillo reported tracking her progress through her phone's calendar application helped her maintain her goal. She plans to print out a calendar so she can physically track her progress. She agreed keep the current goal: 4 16.9 fl-oz bottles of water for three days per week.     STG2 Vocal Relaxation  Ms. Calvillo independently completed the exercises with routine ease.      STG3 Breath Support  Ms. Calvillo performed yoga breath and the snuff-hiss with routine ease. She did her own counting for the yoga breath. She independently performed flow  "phonation, skill 4, but did not appear as secure with the exercise as she did with yoga breath and the snuff hiss.      STG4 Pitch  When Ms. Calvillo listened to her recording of the Grandfather passage, ,she said that her pitch sounded lower than she expected.      Ms. Calvillo produced natural-sounding sentences at an increased pitch. Her recorded average pitch (167 Hz) did not increase or decrease from the previous session's average pitch. She was able to maintain her pitch for whole sentences during the exercise. She produced the highest pitch (173 Hz) on a scripted question (I.e., Do we have any leftovers?). Based on data over multiple sessions, Ms Calvillo consistently increases her pitch more on question statements, suggestive of good use of intonation. Ms. Calvillo was given the option to maintain pitch for 5-10 minute segments during the session, but she opted to maintain her pitch for the entirety of the session. She demonstrated consistent effort to maintain her increased pitch. She dropped her pitch a few times when she responded candidly (e.g., \"tough one\"), but was quick to correct herself when the clinician pointed out the drop in pitch.       STG5 Oral-Forward Resonance  When Ms. Calvillo discussed her use or oral-forward resonance from the Grandfather passage recording, she reported that when she dropped her resonance, it was often related to not utilizing her light-contact breathiness. She said that she especially noticed dropped resonance on low vowels place at the beginning or end of words. Ms. Calvillo's ability to maintain oral-forward resonance at the paragraph level increased from the previous session (from 20% to 60%). Ms. Calvillo continued to lose oral-forward resonance on low vowels. She increased her ability to maintain oral-forward resonance when she selected problem words (I.e., words containing low vowels) from the text before reading the paragraph out loud. She was given a " short demonstration on resonance and how it travels through the oral and nasal cavities. The clinician explained why low vowels, such as /a/ and /o/, are more difficult because the tongue position allows more sound energy to escape through the mouth, as opposed to being forced into the nasal cavity.    STG6 Light-Contact Breathiness  When Ms. Calvillo discussed her use of light-contact breathiness from the Grandfather passage, she reported that instead of light contact, she heard a lot of harsh contact on the ends of words. She hypothesized that the decrease in light-contact breathiness may be due to the fact that it was a cold read and she had not practice her feminine voice via drills prior to the reading. Ms Calvillo decreased her use of light-contact breathiness since she last demonstrated light-contact breathiness two weeks ago (from 100% to 80% accuracy). She performed better when she was reminded to exaggerate her breathiness for the purpose of the exercise and not in relation to her ideal voice.     STG7 Feminine Aspects of Verbal Communication  When Ms. Calvillo discussed her use of light-contact breathiness from the Grandfather passage, she reported that she heard good pauses and pitch variation. She did not comment on what she would improve. She increased the accuracy with which she extended vowels since the previous session that practiced feminine aspects of verbal communication (from 60% to 100%). She also increased the accuracy with which she utilized a chunked speaking rate (from 60% to 100%). Data on conversational elaboration was not taken due to time constraint.     STG8 Feminine Aspects of Nonverbal Communication  Although exercises were not performed due to time constraint, Ms. Calvillo reported that it was helpful to record her homework progress in her phone's calendar application. She plans to print out a calendar soon to make recording progress even easier. She said that she practiced  using gestures with speaking partners four times. She did not specify the speaking partner.      Plan     STG1 Vocal Hygiene: Discuss goal progress: goal met. Ask Ms. Calvillo about her weekly homework goal (drink 4 bottles of water at least three days a week and take notes on barriers and facilitators to success). Continue or modify daily fluid intake goal.      STG2 Vocal Relaxation: Discuss goal progress: goal met. Practice vocal relaxation exercises.     STG3 Breath Support: Discuss goal progress: Goal not met - Yoga breath and snuff hiss look good. Flow phonation needs more work. Practice breath support exercises, including flow phonation, skill 4. Suggest utilizing a sustained phoneme when she doesn't have enough time to practice flow phonation.     STG4 Pitch: Discuss goal progress: Updated goal of 180 Hz not met. Consider lowering the goal further. Compare her current pitch to her base pitch that she used at the beginning of the Fall 2021 treatment period. Practice target pitch (180 Hz, F3-F#3) at the sentence level. Encourage Ms. Calvillo to maintain target pitch throughout the entire session in 5-10 minute segments.      STG5 Oral-Forward Resonance: Discuss goal progress: goal not met. Lots of progress, but still needs work on low vowels. Practice oral-forward resonance at the paragraph level. Remind Ms. Calvillo to watch out for low vowels.     STG6 Light-Contact Breathiness: Discuss goal progress: goal met, but not consistently. lots of progress! Still needs work projecting voice intensity while maintaining breathiness. Practice light-contact breathiness at the paragraph level.      STG7 Feminine Aspects of Verbal Communication: Discuss goal progress: goal met.  Practice extended vowels at the structured conversation level. Practice chunked speaking rate (I.e., short bursts) at the structured conversation level. Practice conversational elaboration at the sentence level.      STG8 Feminine Aspects of  Nonverbal Communication: Discuss goal progress: goal not met. Ms. Calvillo would benefit from putting herself out into the community to practice her nonverbal communication with multiple speaking partners. Ask Ms. Calvillo about her homework. Review arm extension and gesturing exercises.

## 2022-04-27 ENCOUNTER — TELEMEDICINE (OUTPATIENT)
Dept: SPEECH THERAPY | Facility: OTHER | Age: 27
End: 2022-04-27
Payer: COMMERCIAL

## 2022-04-27 DIAGNOSIS — R49.8 OTHER VOICE AND RESONANCE DISORDERS: ICD-10-CM

## 2022-04-27 PROCEDURE — 92507 TX SP LANG VOICE COMM INDIV: CPT | Mod: GT,GN,GC

## 2022-04-29 NOTE — OP THERAPY DAILY TREATMENT
Subjective    The Spring 2022 treatment period concluded Ms. Calvillo's third treatment period  with the [facility] at the [facility]. She attended the Summer 2021 (virtually) and Fall 2021 (virtually) treatment periods  Due to Ms. Calvillo's lack of transportation, services were rendered via a HIPAA-compliant Zoom platform with no less than 25% direct supervision and 100% availability by a licensed and certified speech-language pathologist once a week for 45-60 minute sessions.      Ms. Calvillo attended 9/12 sessions (75% attendance rate). She missed three sessions due to illness. Her primary goals for the treatment period were to improve he fundamental frequency, increase her use of oral-forward resonance and light-contact breathiness, and improve her use of feminine aspects of verbal and nonverbal communication.     During the previous Fall 2021 treatment period, Ms. Calvillo completed [employer], [employer]'s Voice and Communication Program for Transgender Woman (VCMtF) from their book Here's How to Teach Voice and Communication to Transgender Women. The Spring 2022 treatment was guided by the VCMtF program and included the following aspects of speech and language: vocal hygiene, vocal relaxation exercises, breath support exercises, fundamental frequency, oral-forward resonance, light-contact breathiness, feminine aspects of verbal communication, and feminine aspects of nonverbal communication.     Discharge Summary     Long-Term Goal 1: Ms. Calvillo will achieve a more feminine gender-congruent voice in a safe and efficient manner.    Short-Term Goal 1: Ms. Calvillo will increase vocal quality and vocal hygiene in terms of water intake and vocal habits.  · Baseline Measures: Ms Calvillo reported that she drank 3-4 bottles of liquid a day (usually 3 bottles of water and 1 bottle of Powerade).   · Final Data: Ms. Calvillo  "increased her daily water intake to four 16.0 fl-oz bottles of water for at leas three days a week. She consistently met this goal for the last two consecutive treatment sessions.  · Status:  Goal met  · Approaches Used:   Ms. Calvillo set a goal to drink four 16.9 fl-oz bottles of water for at least three days a week and used a calendar to track her progress. Ms. Calvillo discussed her facilitators and barriers to success each week. Ms. Calvillo was educated on the consequences of improper vocal hygiene via a mini-lecture. It is likely that Ms. Calvillo's ability to meet this goal was influenced by her use of daily calendar tracking.       Short-Term Goal 2: Ms. Calvillo will independently perform the following vocal relaxation exercises: laryngeal massage, yawn-sigh, and tongue protrusion.  · Baseline Measures: Ms. Calvillo performed all three vocal relaxation exercises independently. She reported that she practiced laryngeal massage and yawn-sign outside of therapy, but did not consistently perform tongue protrusion.  · Final Data: Ms. Calvillo performed all three vocal relaxation exercises independently. She reported that she performed all three exercises on a daily basis.  · Status: Goal met  · Approaches Used:   Ms. Calvillo was encouraged to become more comfortable with the tongue protrusion exercise. The clinician helped Ms. Calvillo rationalize performing this \"awkward\" exercise by explaining that she should be proud of her transition and all the steps that are involved in this transition. Ms. Calvillo independently took initiation to perform her vocal relaxation exercises on a daily basis.        Short-Term Goal 3: Ms. Calvillo will independently model the following breath support exercises: snuff-hiss, yoga breath, flow phonation   · Baseline Measures: Ms Calvillo performed yoga breath and the snuff-hiss independently.  · Final Data: Ms. Calvillo independently performed the snuff-hiss and " yoga breath. She was getting comfortable with performing flow phonation independently.  · Status: Goal not met  · Approaches Used:   Ms. Calvillo was educated on the purposed and method to produce flow phonation. Flow phonation was a four step process that consisted of Airflow Release Without Voicing, Airflow Release With Voicing, Discrimination in Connected Speech (between breathy phonation, flow phonation, and pressed phonation), and Articulatory Precision. Flow Phonation was created by Hannah Ware and was found in Exercises for Voice Therapy by [employer]. Ms. Calvillo did not meet Short-Term Goal 3 due to her performance of flow phonation. Mastery of flow phonation requires learning multiple steps. It is likely that Ms. Calvillo would have met this goal if the teaching portion had finished earlier in the treatment period and she had more time to practice independently. However, three sessions were cancelled due to illness, which delayed completion of flow phonation. Additionally, it is more likely that this goal could have been met if breath support exercises were practiced at least once daily. Ms. Calvillo reported that she performed her breath support exercises 3-4 times per week.        Short-Term Goal 4: Ms. Calvillo will maintain a fundamental frequency of 180 Hz at the word, phrase, and structured conversation level in 4/5 opportunities.  · Baseline Measures: Ms. Calvillo produced 0/5 words at or above 180 Hz. Her average pitch was 170 (F3) Hz at the word level. Ms. Calvlilo produced 0/5 sentences at or above 180 Hz. Her average pitch was 165 Hz (E3) at the sentence level.  · Final Data: Ms. Calvillo produced 0/5 words at or above 180 Hz. Ms. Torress average pitch was 167 Hz at the sentence level (E3). Data was gathered using the [employer] smart phone application to record fundamental frequency.   · Status: Goal not met  · Approaches Used:   It should be noted that  "during the Spring 2022 treatment period the goal pitch was changed from 220 Hz (A3) to 180 Hz (F#3). Ms. Calvillo was encouraged to maintain intonation while producing and increased fundamental frequency to maintain a natural sounding voice quality (as opposed to a monotone pitch at 180 Hz). Ms. Calvillo was visually prompted to read pre-written sentences via a PowerPoint presentation. The technology should be factored into the fact that Ms. Calvillo did not meet her goal. Additionally, it was difficult to determine if the technology was actually representative of her true pitch because her voice often sounded much higher than the values that the recording device produced.       Short-Term Goal 5: Ms. Calvillo will produce an oral-forward resonance at the word, phrase, and conversational level in 4/5 opportunities independently.   · Baseline Measures: Ms. Calvillo maintained oral-forward resonance with 100% accuracy (5/5) at the word level. Ms. Calvillo maintained oral-forward resonance with 40% accuracy (2/5) at the sentence level. Ms. Calvillo maintained oral-forward resonance with 0% accuracy (0/5) at the structured conversation level.  · Final Data: Ms. Calvillo maintained oral-forward resonance with 60% accuracy (3/5) at the paragraph level. Data was not obtained at the structured conversation level.   · Status: Goal not met.  · Approaches Used:   Ms. Calvillo was prompted to produce oral-forward resonance at the paragraph level. The paragraphs were pre-written and visually presented via a PowerPoint presentation. Ms. Calvillo was educated on sounds that made her drop her resonance into her throat (e.g., words with low vowels such as /a/ and /o/). Ms. Calvillo was instructed to \"read with personality\" to avoid presenting a monotone quality. Ms. Calvillo received an educational lecture about the physiology of her pharyngeal, oral, and nasal cavities, why oral-forward resonance is harder to maintain " on low vowels, and imagery of how she can compensate for low vowels. She was also instructed to slow down and read ahead to anticipate maintaining oral-forward resonance on words with low vowels. Ms. Calvillo did not meet this goal because she was not able to maintain oral-forward resonance on low vowels at the paragraph level. Although a diagram helped explain why forward-oral resonance dropped on low vowels, slowing down and extended practice on words with low vowels would have improved Ms. Calvillo's chances at meeting this goal.        Short-Term Goal 6: Ms. Calvillo will produce light-contact breathiness at the word, phrase, and structured conversation level in 4/5 opportunities independently.   · Baseline Measures: Ms. Calvillo maintained light-contact breathiness with 100% accuracy (5/5) at the word level. Ms. Calvillo maintained light-contact breathiness with 100% (5/5) accuracy at the sentence level. Ms. Calvillo maintained light-contact breathiness with 0% accuracy (0/5) at the structured conversation level. Data was collected though perceptual rating.   · Final Data: Ms. Calvillo maintained light-contact breathiness with 80% accuracy (4/5) at the paragraph level. Data was not obtained at the structured conversation level.  · Status: Goal not met.   · Approaches Used:   Ms. Calvillo was prompted to produce light-contact breathiness at the paragraph level. The paragraphs were pre-written and visually presented via a PowerPoint presentation. Ms. Calvillo was encouraged to exaggerate her breathiness, visualize the air leaving her lungs and exiting through her mouth, and think about the movement of her oral articulators. Ms. Calvillo reported that it helped to slightly curl her tongue against the inside of her maxillary molars. Ms. Calvillo did not meet this goal because she did not have the opportunity to maintain light-contact breathiness at the structured conversation level due to limited  practice outside the therapy session. At the end of the treatment period, Ms. Calvillo had begun to exhibit mastery of light-contact breathiness at the paragraph level.         Short-Term Goal 7: Ms. Calvillo will use more feminine aspects of communication during structured tasks (e.g., rising intonation, chunked speech rate, extended vowels, conversational elaboration).  · Baseline Measures: At the phrase level, Ms. Calvillo extended her vowels with 47% accuracy (7/15 phrases), used a chunked speech rate with 14% accuracy (2/14 phrases), and used varied intonation with 27% accuracy (4/15 phrases). At the paragraph level, Ms. Calvillo extended her vowels with 0% accuracy (0/5 paragraphs), implemented a chunked speech rate with 0% accuracy (0/5 paragraphs), and used varied intonation with 0% accuracy (0/5). No data was obtained at the structured conversation level.  · Final Data: Ms. Calvillo extended her vowel with 100% accuracy (5/5 prompts) at the structured conversation level. Ms. Calvillo implemented a chunked speech rate with 100% accuracy (5/5) prompts at the structured conversation level. Ms. Calvillo demonstrated conversational elaboration with 100% accuracy (5/5 prompts) at the structured conversation level.  · Status: Goal met  · Approaches Used:   Phrases were modeled by the clinician and repeated by Ms. Calvillo. Ms. Calvillo responded well to visual cues. Production at the structured conversation level were often reviewed by both Ms. Calvillo and the clinician to discuss areas of growth and areas that needed improvement. Ms. Calvillo met this goal because she implemented feminine aspects of verbal communication into every aspect of her speech, even between exercises and drills. Her success is likely due to her extended practice outside of the therapy session.      Short-Term Goal 8: Ms. Calvillo will use more feminine nonverbal aspects of communication in structured tasks (e.g., fluid  gestures and animated facial expressions) in 4/5 opportunities independently.  · Baseline Measures: Ms. Calvillo mimed three actions using feminine gestures with 0% accuracy (0/3).  · Final Data: Ms. Calvillo mimed actions with feminine gestures with 100% accuracy (3/3) (e.g., answering the phone, sending a text message, drinking from a bottle of water).   · Status: Goal met  · Approaches Used:   Ms. Calvillo was asked to observe a sitcom and take notes on male versus female nonverbal communication. Ms. Calvillo identified that females conventionally smile more when listening, use hand gestures with increased movement, and use increased facial expressions, especially in the eyebrows. She picked two aspects of nonverbal communication (fluid hand gestures with increased wrist mobility and increased eye brow movement) and practiced these gestures privately and with communication partners as homework outside of the therapy session. Ms. Calvillo recorded her progress (e.g., how many times per day practiced) in a calendar.  Ms. Calvillo met this goal because she demonstrated understanding and knowledge of feminine aspects of nonverbal communication. She confidently displayed feminine gestures in structured tasks and had begun to extend the gestures to casual conversation outside of the drills and exercises. Completion of this goal is likely due to Ms. Calvillo's ability to generalize these tasks to her world outside of therapy (e.g., practicing movements she may make while working at [employer] in the mirror). Additionally, it is likely that Ms. Calvillo's use of calendar tracking to practice feminine gestures with communication partners contributed to her success.          Plan  Ms. Calvillo will be discharged due to patient request to practice on her own. Ms. Calvillo is aware that she is welcome back to the [facility] when the time is appropriate.    If Ms. Calvillo returns to voice therapy  in the future, the following recommendations should be considered:    · It is recommended that therapy is in person one time per week for 45-60 minute sessions.  · If therapy is conducted virtually, do not allow Ms. Calvillo to use headphones. It is likely that the air pods Ms. Calvillo used over the Spring 2022 treatment period distorted sound. For example, all exercises in which Ms. Calvillo was required to produce sustain phonemes sounded choppy. It was difficult to perceive how strong her breath support was.   · Research a more accurate pitch recording device. The perceived pitch that Ms. Calvillo produced sounded much higher than the recorded pitch. Consider eliminating any white noise that could pull the average fundamental frequency down to a lower value.  · If needed, consider practicing a more feminine gait. This was not addressed during the Spring 2022 treatment period.   · Recommended Goals:  · STG1 - Vocal Hygiene: Ms. Calvillo will drink 4 16.9 fl-oz bottles of water per day for at least five days per week.  · STG2 - Vocal Relaxation: Ms. Calvillo will perform the following three exercises independently at least once a day and before speaking: laryngeal massage, yawn-sigh, tongue protrusion.  · STG3 - Breath Support: Ms. Calvillo will perform the following three exercises independently at least once a day and before speaking: yoga breath, snuff-hiss, flow phonation.  · STG4 - Pitch: Ms. Calvillo will maintain an average fundamental frequency of 180 Hz (F#) with natural intonation in 4/5 opportunities at the structured conversation level.  · STG5 - Oral-Forward Resonance: Ms. Calvillo will maintain oral-forward resonance in 2-minute conversations in 4/5 opportunities.  · STG6 - Light-Contact Breathiness: Ms. Calvillo will maintain light-contact breathiness in 2-minute conversations in 4/5 opportunities.  · STG7 - Feminine Aspects of Verbal Communication: Ms. Calvillo will maintain feminine  aspects of verbal communication (e.g., extended vowels, chunked speech rate, conversational elaboration, adverbial clause) in 3-minute conversations in 4/5 opportunities.  · STG8 - Feminine Aspects of Nonverbal Communication: Ms. Calvillo will exhibit feminine aspects of nonverbal communication (e.g., increased facial expression, increased nodding when listening, use of fluid hand gestures with increased wrist mobility, elbows held closer to torso) in 3-minute conversations in 4/5 opportunities.

## 2022-05-03 NOTE — OP THERAPY DISCHARGE SUMMARY
Subjective    The Spring 2022 treatment period concluded Ms. Calvillo's third treatment period  with the [facility] at the [facility]) [facility]. She attended the Summer 2021 (virtually) and Fall 2021 (virtually) treatment periods  Due to Ms. Calvillo's lack of transportation, services were rendered via a HIPAA-compliant Zoom platform with no less than 25% direct supervision and 100% availability by a licensed and certified speech-language pathologist once a week for 45-60 minute sessions.      Ms. Calvillo attended 9/12 sessions (75% attendance rate). She missed three sessions due to illness. Her primary goals for the treatment period were to improve he fundamental frequency, increase her use of oral-forward resonance and light-contact breathiness, and improve her use of feminine aspects of verbal and nonverbal communication.     During the previous Fall 2021 treatment period, Ms. Calvillo completed Olsz[employer]ski, [employer], [employer]'s Voice and Communication Program for Transgender Woman (VCMtF) from their book Here's How to Teach Voice and Communication to Transgender Women. The Spring 2022 treatment was guided by the VCMtF program and included the following aspects of speech and language: vocal hygiene, vocal relaxation exercises, breath support exercises, fundamental frequency, oral-forward resonance, light-contact breathiness, feminine aspects of verbal communication, and feminine aspects of nonverbal communication.     Discharge Summary     Long-Term Goal 1: Ms. Calvillo will achieve a more feminine gender-congruent voice in a safe and efficient manner.    Short-Term Goal 1: Ms. Calvillo will increase vocal quality and vocal hygiene in terms of water intake and vocal habits.  · Baseline Measures: Ms Calvillo reported that she drank 3-4 bottles of liquid a day (usually 3 bottles of water and 1 bottle of Powerade).   · Final Data: Ms. Calvillo  "increased her daily water intake to four 16.0 fl-oz bottles of water for at leas three days a week. She consistently met this goal for the last two consecutive treatment sessions.  · Status:  Goal met  · Approaches Used:   Ms. Calvillo set a goal to drink four 16.9 fl-oz bottles of water for at least three days a week and used a calendar to track her progress. Ms. Calvillo discussed her facilitators and barriers to success each week. Ms. Calvillo was educated on the consequences of improper vocal hygiene via a mini-lecture. It is likely that Ms. Calvillo's ability to meet this goal was influenced by her use of daily calendar tracking.       Short-Term Goal 2: Ms. Calvillo will independently perform the following vocal relaxation exercises: laryngeal massage, yawn-sigh, and tongue protrusion.  · Baseline Measures: Ms. Calvillo performed all three vocal relaxation exercises independently. She reported that she practiced laryngeal massage and yawn-sign outside of therapy, but did not consistently perform tongue protrusion.  · Final Data: Ms. Calvillo performed all three vocal relaxation exercises independently. She reported that she performed all three exercises on a daily basis.  · Status: Goal met  · Approaches Used:   Ms. Calvillo was encouraged to become more comfortable with the tongue protrusion exercise. The clinician helped Ms. Calvillo rationalize performing this \"awkward\" exercise by explaining that she should be proud of her transition and all the steps that are involved in this transition. Ms. Calvillo independently took initiation to perform her vocal relaxation exercises on a daily basis.        Short-Term Goal 3: Ms. Calvillo will independently model the following breath support exercises: snuff-hiss, yoga breath, flow phonation   · Baseline Measures: Ms Calvillo performed yoga breath and the snuff-hiss independently.  · Final Data: Ms. Calvillo independently performed the snuff-hiss and " yoga breath. She was getting comfortable with performing flow phonation independently.  · Status: Goal not met  · Approaches Used:   Ms. Calvillo was educated on the purposed and method to produce flow phonation. Flow phonation was a four step process that consisted of Airflow Release Without Voicing, Airflow Release With Voicing, Discrimination in Connected Speech (between breathy phonation, flow phonation, and pressed phonation), and Articulatory Precision. Flow Phonation was created by Hannah Ware and was found in Exercises for Voice Therapy by Beh[employer]an [employer]. Ms. Calvillo did not meet Short-Term Goal 3 due to her performance of flow phonation. Mastery of flow phonation requires learning multiple steps. It is likely that Ms. Calvillo would have met this goal if the teaching portion had finished earlier in the treatment period and she had more time to practice independently. However, three sessions were cancelled due to illness, which delayed completion of flow phonation. Additionally, it is more likely that this goal could have been met if breath support exercises were practiced at least once daily. Ms. Calvillo reported that she performed her breath support exercises 3-4 times per week.        Short-Term Goal 4: Ms. Calvillo will maintain a fundamental frequency of 180 Hz at the word, phrase, and structured conversation level in 4/5 opportunities.  · Baseline Measures: Ms. Calvillo produced 0/5 words at or above 180 Hz. Her average pitch was 170 (F3) Hz at the word level. Ms. Calvillo produced 0/5 sentences at or above 180 Hz. Her average pitch was 165 Hz (E3) at the sentence level.  · Final Data: Ms. Calvillo produced 0/5 words at or above 180 Hz. Ms. Torress average pitch was 167 Hz at the sentence level (E3). Data was gathered using the [employer] smart phone application to record fundamental frequency.   · Status: Goal not met  · Approaches Used:   It should be noted that  "during the Spring 2022 treatment period the goal pitch was changed from 220 Hz (A3) to 180 Hz (F#3). Ms. Calvillo was encouraged to maintain intonation while producing and increased fundamental frequency to maintain a natural sounding voice quality (as opposed to a monotone pitch at 180 Hz). Ms. Calvillo was visually prompted to read pre-written sentences via a PowerPoint presentation. The technology should be factored into the fact that Ms. Calvillo did not meet her goal. Additionally, it was difficult to determine if the technology was actually representative of her true pitch because her voice often sounded much higher than the values that the recording device produced.       Short-Term Goal 5: Ms. Calvillo will produce an oral-forward resonance at the word, phrase, and conversational level in 4/5 opportunities independently.   · Baseline Measures: Ms. Calvillo maintained oral-forward resonance with 100% accuracy (5/5) at the word level. Ms. Calvillo maintained oral-forward resonance with 40% accuracy (2/5) at the sentence level. Ms. Calvillo maintained oral-forward resonance with 0% accuracy (0/5) at the structured conversation level.  · Final Data: Ms. Calvillo maintained oral-forward resonance with 60% accuracy (3/5) at the paragraph level. Data was not obtained at the structured conversation level.   · Status: Goal not met.  · Approaches Used:   Ms. Calvillo was prompted to produce oral-forward resonance at the paragraph level. The paragraphs were pre-written and visually presented via a PowerPoint presentation. Ms. Calvillo was educated on sounds that made her drop her resonance into her throat (e.g., words with low vowels such as /a/ and /o/). Ms. Calvillo was instructed to \"read with personality\" to avoid presenting a monotone quality. Ms. Calvillo received an educational lecture about the physiology of her pharyngeal, oral, and nasal cavities, why oral-forward resonance is harder to maintain " on low vowels, and imagery of how she can compensate for low vowels. She was also instructed to slow down and read ahead to anticipate maintaining oral-forward resonance on words with low vowels. Ms. Calvillo did not meet this goal because she was not able to maintain oral-forward resonance on low vowels at the paragraph level. Although a diagram helped explain why forward-oral resonance dropped on low vowels, slowing down and extended practice on words with low vowels would have improved Ms. Calvillo's chances at meeting this goal.        Short-Term Goal 6: Ms. Calvillo will produce light-contact breathiness at the word, phrase, and structured conversation level in 4/5 opportunities independently.   · Baseline Measures: Ms. Calvillo maintained light-contact breathiness with 100% accuracy (5/5) at the word level. Ms. Calvillo maintained light-contact breathiness with 100% (5/5) accuracy at the sentence level. Ms. Calvillo maintained light-contact breathiness with 0% accuracy (0/5) at the structured conversation level. Data was collected though perceptual rating.   · Final Data: Ms. Calvillo maintained light-contact breathiness with 80% accuracy (4/5) at the paragraph level. Data was not obtained at the structured conversation level.  · Status: Goal not met.   · Approaches Used:   Ms. Calvillo was prompted to produce light-contact breathiness at the paragraph level. The paragraphs were pre-written and visually presented via a PowerPoint presentation. Ms. Calvillo was encouraged to exaggerate her breathiness, visualize the air leaving her lungs and exiting through her mouth, and think about the movement of her oral articulators. Ms. Calvillo reported that it helped to slightly curl her tongue against the inside of her maxillary molars. Ms. Calvillo did not meet this goal because she did not have the opportunity to maintain light-contact breathiness at the structured conversation level due to limited  practice outside the therapy session. At the end of the treatment period, Ms. Calvillo had begun to exhibit mastery of light-contact breathiness at the paragraph level.         Short-Term Goal 7: Ms. Calvillo will use more feminine aspects of communication during structured tasks (e.g., rising intonation, chunked speech rate, extended vowels, conversational elaboration).  · Baseline Measures: At the phrase level, Ms. Calvillo extended her vowels with 47% accuracy (7/15 phrases), used a chunked speech rate with 14% accuracy (2/14 phrases), and used varied intonation with 27% accuracy (4/15 phrases). At the paragraph level, Ms. Calvillo extended her vowels with 0% accuracy (0/5 paragraphs), implemented a chunked speech rate with 0% accuracy (0/5 paragraphs), and used varied intonation with 0% accuracy (0/5). No data was obtained at the structured conversation level.  · Final Data: Ms. Calvillo extended her vowel with 100% accuracy (5/5 prompts) at the structured conversation level. Ms. Calvillo implemented a chunked speech rate with 100% accuracy (5/5) prompts at the structured conversation level. Ms. Calvillo demonstrated conversational elaboration with 100% accuracy (5/5 prompts) at the structured conversation level.  · Status: Goal met  · Approaches Used:   Phrases were modeled by the clinician and repeated by Ms. Calvillo. Ms. Calvillo responded well to visual cues. Production at the structured conversation level were often reviewed by both Ms. Calvillo and the clinician to discuss areas of growth and areas that needed improvement. Ms. Calvillo met this goal because she implemented feminine aspects of verbal communication into every aspect of her speech, even between exercises and drills. Her success is likely due to her extended practice outside of the therapy session.      Short-Term Goal 8: Ms. Calvillo will use more feminine nonverbal aspects of communication in structured tasks (e.g., fluid  gestures and animated facial expressions) in 4/5 opportunities independently.  · Baseline Measures: Ms. Calvillo mimed three actions using feminine gestures with 0% accuracy (0/3).  · Final Data: Ms. Calvillo mimed actions with feminine gestures with 100% accuracy (3/3) (e.g., answering the phone, sending a text message, drinking from a bottle of water).   · Status: Goal met  · Approaches Used:   Ms. Calvillo was asked to observe a sitcom and take notes on male versus female nonverbal communication. Ms. Calvillo identified that females conventionally smile more when listening, use hand gestures with increased movement, and use increased facial expressions, especially in the eyebrows. She picked two aspects of nonverbal communication (fluid hand gestures with increased wrist mobility and increased eye brow movement) and practiced these gestures privately and with communication partners as homework outside of the therapy session. Ms. Calvillo recorded her progress (e.g., how many times per day practiced) in a calendar.  Ms. Calvillo met this goal because she demonstrated understanding and knowledge of feminine aspects of nonverbal communication. She confidently displayed feminine gestures in structured tasks and had begun to extend the gestures to casual conversation outside of the drills and exercises. Completion of this goal is likely due to Ms. Calvillo's ability to generalize these tasks to her world outside of therapy (e.g., practicing movements she may make while working at [employer] in the mirror). Additionally, it is likely that Ms. Calvillo's use of calendar tracking to practice feminine gestures with communication partners contributed to her success.          Plan  Ms. Calvillo will be discharged due to patient request to practice on her own. Ms. Calvillo is aware that she is welcome back to the [facility] when the time is appropriate.    If Ms. Calvillo returns to voice therapy  in the future, the following recommendations should be considered:    · It is recommended that therapy is in person one time per week for 45-60 minute sessions.  · If therapy is conducted virtually, do not allow Ms. Calvillo to use headphones. It is likely that the air pods Ms. Calvillo used over the Spring 2022 treatment period distorted sound. For example, all exercises in which Ms. Calvillo was required to produce sustain phonemes sounded choppy. It was difficult to perceive how strong her breath support was.   · Research a more accurate pitch recording device. The perceived pitch that Ms. Calvillo produced sounded much higher than the recorded pitch. Consider eliminating any white noise that could pull the average fundamental frequency down to a lower value.  · If needed, consider practicing a more feminine gait. This was not addressed during the Spring 2022 treatment period.   · Recommended Goals:  · STG1 - Vocal Hygiene: Ms. Calvillo will drink 4 16.9 fl-oz bottles of water per day for at least five days per week.  · STG2 - Vocal Relaxation: Ms. Calvillo will perform the following three exercises independently at least once a day and before speaking: laryngeal massage, yawn-sigh, tongue protrusion.  · STG3 - Breath Support: Ms. Calvillo will perform the following three exercises independently at least once a day and before speaking: yoga breath, snuff-hiss, flow phonation.  · STG4 - Pitch: Ms. Calvillo will maintain an average fundamental frequency of 180 Hz (F#) with natural intonation in 4/5 opportunities at the structured conversation level.  · STG5 - Oral-Forward Resonance: Ms. Calvillo will maintain oral-forward resonance in 2-minute conversations in 4/5 opportunities.  · STG6 - Light-Contact Breathiness: Ms. Calvillo will maintain light-contact breathiness in 2-minute conversations in 4/5 opportunities.  · STG7 - Feminine Aspects of Verbal Communication: Ms. Calvillo will maintain feminine  aspects of verbal communication (e.g., extended vowels, chunked speech rate, conversational elaboration, adverbial clause) in 3-minute conversations in 4/5 opportunities.  · STG8 - Feminine Aspects of Nonverbal Communication: Ms. Calvillo will exhibit feminine aspects of nonverbal communication (e.g., increased facial expression, increased nodding when listening, use of fluid hand gestures with increased wrist mobility, elbows held closer to torso) in 3-minute conversations in 4/5 opportunities.

## 2022-06-15 ENCOUNTER — HOSPITAL ENCOUNTER (OUTPATIENT)
Dept: LAB | Facility: MEDICAL CENTER | Age: 27
End: 2022-06-15
Attending: FAMILY MEDICINE
Payer: COMMERCIAL

## 2022-06-15 LAB
ANION GAP SERPL CALC-SCNC: 10 MMOL/L (ref 7–16)
BUN SERPL-MCNC: 14 MG/DL (ref 8–22)
CALCIUM SERPL-MCNC: 9.4 MG/DL (ref 8.5–10.5)
CHLORIDE SERPL-SCNC: 103 MMOL/L (ref 96–112)
CO2 SERPL-SCNC: 25 MMOL/L (ref 20–33)
CREAT SERPL-MCNC: 1.04 MG/DL (ref 0.5–1.4)
ESTRADIOL SERPL-MCNC: 41.8 PG/ML
GFR SERPLBLD CREATININE-BSD FMLA CKD-EPI: 102 ML/MIN/1.73 M 2
GLUCOSE SERPL-MCNC: 99 MG/DL (ref 65–99)
POTASSIUM SERPL-SCNC: 4.1 MMOL/L (ref 3.6–5.5)
SODIUM SERPL-SCNC: 138 MMOL/L (ref 135–145)
TESTOST SERPL-MCNC: 207 NG/DL (ref 175–781)

## 2022-06-15 PROCEDURE — 82670 ASSAY OF TOTAL ESTRADIOL: CPT

## 2022-06-15 PROCEDURE — 84403 ASSAY OF TOTAL TESTOSTERONE: CPT

## 2022-06-15 PROCEDURE — 80048 BASIC METABOLIC PNL TOTAL CA: CPT

## 2022-06-15 PROCEDURE — 36415 COLL VENOUS BLD VENIPUNCTURE: CPT

## 2022-09-14 ENCOUNTER — HOSPITAL ENCOUNTER (OUTPATIENT)
Dept: LAB | Facility: MEDICAL CENTER | Age: 27
End: 2022-09-14
Attending: FAMILY MEDICINE
Payer: COMMERCIAL

## 2022-09-14 LAB
ALBUMIN SERPL BCP-MCNC: 4.4 G/DL (ref 3.2–4.9)
ALBUMIN/GLOB SERPL: 1.6 G/DL
ALP SERPL-CCNC: 67 U/L (ref 30–99)
ALT SERPL-CCNC: 16 U/L (ref 2–50)
ANION GAP SERPL CALC-SCNC: 9 MMOL/L (ref 7–16)
AST SERPL-CCNC: 16 U/L (ref 12–45)
BILIRUB SERPL-MCNC: 0.4 MG/DL (ref 0.1–1.5)
BUN SERPL-MCNC: 8 MG/DL (ref 8–22)
CALCIUM SERPL-MCNC: 8.9 MG/DL (ref 8.5–10.5)
CHLORIDE SERPL-SCNC: 105 MMOL/L (ref 96–112)
CHOLEST SERPL-MCNC: 184 MG/DL (ref 100–199)
CO2 SERPL-SCNC: 25 MMOL/L (ref 20–33)
CREAT SERPL-MCNC: 0.91 MG/DL (ref 0.5–1.4)
ESTRADIOL SERPL-MCNC: 124 PG/ML
GFR SERPLBLD CREATININE-BSD FMLA CKD-EPI: 119 ML/MIN/1.73 M 2
GLOBULIN SER CALC-MCNC: 2.8 G/DL (ref 1.9–3.5)
GLUCOSE SERPL-MCNC: 91 MG/DL (ref 65–99)
HDLC SERPL-MCNC: 45 MG/DL
LDLC SERPL CALC-MCNC: 113 MG/DL
POTASSIUM SERPL-SCNC: 4.2 MMOL/L (ref 3.6–5.5)
PROT SERPL-MCNC: 7.2 G/DL (ref 6–8.2)
SODIUM SERPL-SCNC: 139 MMOL/L (ref 135–145)
TESTOST SERPL-MCNC: 60 NG/DL (ref 175–781)
TRIGL SERPL-MCNC: 130 MG/DL (ref 0–149)

## 2022-09-14 PROCEDURE — 36415 COLL VENOUS BLD VENIPUNCTURE: CPT

## 2022-09-14 PROCEDURE — 80053 COMPREHEN METABOLIC PANEL: CPT

## 2022-09-14 PROCEDURE — 84403 ASSAY OF TOTAL TESTOSTERONE: CPT

## 2022-09-14 PROCEDURE — 80061 LIPID PANEL: CPT

## 2022-09-14 PROCEDURE — 82670 ASSAY OF TOTAL ESTRADIOL: CPT

## 2023-04-22 ENCOUNTER — HOSPITAL ENCOUNTER (OUTPATIENT)
Dept: LAB | Facility: MEDICAL CENTER | Age: 28
End: 2023-04-22
Attending: FAMILY MEDICINE
Payer: COMMERCIAL

## 2023-04-22 LAB
ALBUMIN SERPL BCP-MCNC: 4.2 G/DL (ref 3.2–4.9)
ALBUMIN/GLOB SERPL: 1.4 G/DL
ALP SERPL-CCNC: 76 U/L (ref 30–99)
ALT SERPL-CCNC: 18 U/L (ref 2–50)
ANION GAP SERPL CALC-SCNC: 14 MMOL/L (ref 7–16)
AST SERPL-CCNC: 9 U/L (ref 12–45)
BILIRUB SERPL-MCNC: 0.4 MG/DL (ref 0.1–1.5)
BUN SERPL-MCNC: 9 MG/DL (ref 8–22)
CALCIUM ALBUM COR SERPL-MCNC: 8.9 MG/DL (ref 8.5–10.5)
CALCIUM SERPL-MCNC: 9.1 MG/DL (ref 8.5–10.5)
CHLORIDE SERPL-SCNC: 105 MMOL/L (ref 96–112)
CHOLEST SERPL-MCNC: 193 MG/DL (ref 100–199)
CO2 SERPL-SCNC: 21 MMOL/L (ref 20–33)
CREAT SERPL-MCNC: 0.95 MG/DL (ref 0.5–1.4)
ESTRADIOL SERPL-MCNC: 93.4 PG/ML
FASTING STATUS PATIENT QL REPORTED: NORMAL
GFR SERPLBLD CREATININE-BSD FMLA CKD-EPI: 113 ML/MIN/1.73 M 2
GLOBULIN SER CALC-MCNC: 3.1 G/DL (ref 1.9–3.5)
GLUCOSE SERPL-MCNC: 94 MG/DL (ref 65–99)
HDLC SERPL-MCNC: 46 MG/DL
LDLC SERPL CALC-MCNC: 101 MG/DL
POTASSIUM SERPL-SCNC: 4 MMOL/L (ref 3.6–5.5)
PROT SERPL-MCNC: 7.3 G/DL (ref 6–8.2)
SODIUM SERPL-SCNC: 140 MMOL/L (ref 135–145)
TRIGL SERPL-MCNC: 229 MG/DL (ref 0–149)

## 2023-04-22 PROCEDURE — 84402 ASSAY OF FREE TESTOSTERONE: CPT

## 2023-04-22 PROCEDURE — 80061 LIPID PANEL: CPT

## 2023-04-22 PROCEDURE — 84270 ASSAY OF SEX HORMONE GLOBUL: CPT

## 2023-04-22 PROCEDURE — 36415 COLL VENOUS BLD VENIPUNCTURE: CPT

## 2023-04-22 PROCEDURE — 84403 ASSAY OF TOTAL TESTOSTERONE: CPT

## 2023-04-22 PROCEDURE — 80053 COMPREHEN METABOLIC PANEL: CPT

## 2023-04-22 PROCEDURE — 82670 ASSAY OF TOTAL ESTRADIOL: CPT

## 2023-04-25 LAB
SHBG SERPL-SCNC: 74 NMOL/L (ref 17–56)
TESTOST FREE MFR SERPL: 1 % (ref 1.6–2.9)
TESTOST FREE SERPL-MCNC: 25 PG/ML (ref 47–244)
TESTOST SERPL-MCNC: 236 NG/DL (ref 300–1080)

## 2023-12-21 NOTE — OP THERAPY DAILY TREATMENT
"Subjective       Due to the ongoing Covid-19 public health emergency, speech teletherapy services were rendered via a HIPAA-compliant Zoom platform with no less than 25% direct supervision, and 100% availability by a licensed and certified speech-language pathologist.       Due to technical difficulties, Ms. Calvillo logged on to the Zoom session at 3:10 pm. Ms. Calvillo initiated topics of conversation, smiled, and laughed often during therapy activities. Also, she demonstrated motivation to spontaneously converse with her ideal voice and target pitch. She accepted feedback graciously with self-awareness for vocal aspects she would like to improve.       Objective     The following activities were completed from Sessions 7 and 8 of the Transgender Voice and Communication treatment program:       Review homework: Ms. Calvillo completed the exercises from the Session 8 homework handout \"about every other day\" for the past two weeks. Ms. Calvillo reportedly reads her homework chart and practice exercises from the power point slides on her computer and phone to track her progress. She did not report any new vocal hygiene habits or concerns.       Vocal Relaxation: Ms. Calvillo independently completed three vocal relaxation techniques: laryngeal massage, yawn-sigh, and tongue protrusion with phonation of the /i/ phoneme without clinician support through modeling.       Breath Support: Ms. Calvillo independently completed two breath support techniques: yoga breathing and snuff-hiss without clinician support through modeling.     Resonance: Ms. Calvillo independently completed three resonance exercises without clinician support through modeling (I.e., neck stretches, lip trills without voicing, and lip trills with downward pitch voicing).       Spontaneous Speech Activities: Ms. Calvillo completed the following spontaneous speech activities while practicing all aspects of voice (e.g., pitch, resonance, " "breathiness, verbal communication, and nonverbal communication): reading a passage containing 40 clauses, playing a descriptive \"taboo\" game, playing charades, and participating in natural conversations with moderate clinician support through modeling and scaffolding her responses.       Homework: Ms. Calvillo will complete the \"Week 2: Independent Practice\" homework handout from Session 8 of the Transgender Voice and Communication treatment program while recording herself during practice. Also, Ms. Calvillo was encouraged to participate in structured and spontaneous conversations with both familiar and unfamiliar listeners this week.       Assessment       Review Homework: Ms. Calvillo independently completed the exercises from the previous session. She reported the homework continues to feel easier to complete than the first session. Ms. Calvillo also reported continued use of the [employer] dulce on her phone to help her initially produce her target pitch.       Vocal Relaxation, Breath Support, and Resonance Warm-Up Activities:   Ms. Calvillo completed these exercises independently with ease. She expressed an increased comfortability with the exercises since the first session. Ms. Calvillo did not require spoken clinician support or modeling during these activities. Ms. Calvillo received minimal visual support for the yoga breathing exercise as demonstrated by the clinician counting up to five seconds on her fingers for each of Ms. Calvillo's inhalations, holding air, and exhalations.       Spontaneous Speech Activities: Ms. Calvillo required moderate support through clinician modeling and scaffolding to maintain her target pitch, oral-forward resonance, and light contact breathiness throughout the spontaneous speech activities (e.g., reading passage, taboo game, charades, and natural conversations). Overall, Ms. Calvillo produced her target pitch (A3) consistently at the sentence level while reading a " "passage, and she displayed an appropriate range of oral-forward resonance and light-contact breathiness with clinician support across therapy activities. Ms. Calvillo exhibited more control over the various aspects of voice during structured tasks (e.g., reading a passage) in comparison to spontaneous tasks (e.g. natural conversations, taboo game, or charades). Ms. Calvillo exhibited good use of verbal communication skills, such as rising intonation, vowel prolongation for emotional emphasis, and speaking in short bursts (e.g., pausing every four to six words). Also, she naturally demonstrated more aspects of nonverbal communication than in previous sessions, such as smiling, appropriate eye contact, head tilting, and arm movements originating from the elbows. Ms. Calvillo continued to independently use the [employer] dulce on her phone for auditory support of her target pitch.The clinician used audio-recordings to provide specific auditory biofeedback for Ms. Calvillo while she read the passage and played the descriptive taboo game. Once provided with clinician models and scaffolding, Ms. Calvillo demonstrated increased awareness of her voice by pointing out vocal aspects she would like to improve (I.e., oral-forward resonance with light contact breathiness).        Homework: Ms. Calvillo stated that she will complete the \"Week 2: Independent Practice\" homework handout from Session 8 of the Transgender Voice and Communication treatment program. Also, she will track her vocal hygiene (e.g. water intake), complete vocal warm ups, and record both structured and spontaneous speech samples. During practice, Ms. Calvillo was encouraged to briefly converse with both familiar and unfamiliar listeners using her ideal speaking voice to receive additional feedback about various aspects of her voice.         Plan   Due to the Thanksgiving holiday next Thursday, the next session will take place Monday November 22nd from 12-1 pm " via a HIPAA compliant Zoom meeting. The next session will compare Ms. Calvillo's vocal progress from Session 1 of the Transgender Voice and Communication treatment program to the current session related to speaking fundamental frequency, breathiness, resonance, verbal communication, and nonverbal communication skills. Additionally, the clinician will collect Ms. Calvillo's end of treatment period self-assessment and self-reflection measures regarding her perceived progress throughout the program for both structured and spontaneous speech activities with familiar and unfamiliar listeners.     Attestation:  I was present and directly supervised the treatment, including active involvement in the patient’s care, and directing the student clinician in the service. I agree with the assessment and treatment plan of care provided.     Him/He